# Patient Record
Sex: FEMALE | Race: BLACK OR AFRICAN AMERICAN
[De-identification: names, ages, dates, MRNs, and addresses within clinical notes are randomized per-mention and may not be internally consistent; named-entity substitution may affect disease eponyms.]

---

## 2017-04-24 ENCOUNTER — HOSPITAL ENCOUNTER (EMERGENCY)
Dept: HOSPITAL 74 - JERFT | Age: 57
Discharge: HOME | End: 2017-04-24
Payer: COMMERCIAL

## 2017-04-24 VITALS — TEMPERATURE: 100 F | DIASTOLIC BLOOD PRESSURE: 95 MMHG | SYSTOLIC BLOOD PRESSURE: 148 MMHG | HEART RATE: 102 BPM

## 2017-04-24 VITALS — BODY MASS INDEX: 35.3 KG/M2

## 2017-04-24 DIAGNOSIS — B95.0: ICD-10-CM

## 2017-04-24 DIAGNOSIS — N30.00: ICD-10-CM

## 2017-04-24 DIAGNOSIS — N20.0: ICD-10-CM

## 2017-04-24 DIAGNOSIS — J03.90: Primary | ICD-10-CM

## 2017-04-24 LAB
APPEARANCE UR: CLEAR
BILIRUB UR STRIP.AUTO-MCNC: NEGATIVE MG/DL
COLOR UR: YELLOW
KETONES UR QL STRIP: NEGATIVE
LEUKOCYTE ESTERASE UR QL STRIP.AUTO: (no result)
MUCOUS THREADS URNS QL MICRO: (no result)
NITRITE UR QL STRIP: NEGATIVE
PH UR: 5 [PH] (ref 5–8)
PROT UR QL STRIP: NEGATIVE
PROT UR QL STRIP: NEGATIVE
RBC # BLD AUTO: 1 /HPF (ref 0–3)
RBC # UR STRIP: (no result) /UL
SP GR UR: 1.02 (ref 1–1.03)
UROBILINOGEN UR STRIP-MCNC: NEGATIVE E.U./DL (ref 0.2–1)
WBC # UR AUTO: 2 /HPF (ref 3–5)

## 2017-04-24 NOTE — PDOC
History of Present Illness





- General


Chief Complaint: Sore Throat


Stated Complaint: SORE THROAT, FEVER


Time Seen by Provider: 04/24/17 09:15


History Source: Patient


Exam Limitations: No Limitations





- History of Present Illness


Initial Comments: 


04/24/17 09:16











Chief complaint: Body aches, sore throat, fever











History of present illness: Patient is a 57-year-old female history of 

hypertension, hyperlipidemia, COPD and diabetes controlled by diet here today 

complaining of generalized body aches, sore throat, fever, nausea, dry cough , 

rt. sided back pain since yesterday. She had one episode of vomiting this 

morning. Pt. also reports urinary frequency, urgency and dysuria since 04/21/ 2017. Since 04/21/2017 relieved by using her resuce pump.Patient reports that 

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 Patient denies any recent 

travel. Pt. reports feeling slightly short of breath presently. 





04/24/17 09:16








04/24/17 09:33








04/24/17 09:40








04/24/17 12:00








04/25/17 08:46





Timing/Duration: getting worse, changing over time


Severity: moderate


Associated Symptoms: reports: cough, fever/chills (since yesterday ), loss of 

appetite, nausea/vomiting (nausea X last 2 days, vomited once today ), 

shortness of breath (intermittently since 4/21/17 with exertion relieved by pump

), other (urgency, frequency, rt. sided lower back pain )





Past History





- Past Medical History


Allergies/Adverse Reactions: 


 Allergies











Allergy/AdvReac Type Severity Reaction Status Date / Time


 


No Known Allergies Allergy   Verified 04/24/17 08:53











Home Medications: 


Ambulatory Orders





Amlodipine Besylate [Norvasc -] 5 mg PO DAILY 03/19/15 


Colesevelam HCl [Welchol] 3.75 gm PO DAILY 03/19/15 


Nitrofurantoin Monohyd/M-Cryst [Macrobid -] 100 mg PO BID #14 capsule MDD 2 04/ 24/17 








COPD: Yes


Diabetes: Yes (DIET CONTROLLED)


HTN: Yes


Hypercholesterolemia: Yes (does not take medication)





- Surgical History


Cholecystectomy: Yes





- Immunization History


Immunization Up to Date: Yes





- Psycho/Social/Smoking Cessation Hx


Anxiety: No


Suicidal Ideation: No


Smoking Status: No


Smoking History: Never smoked


Have you smoked in the past 12 months: No


Number of Cigarettes Smoked Daily: 0


If you are a former smoker, when did you quit?: 5 years ago


Information on smoking cessation initiated: No


Hx Alcohol Use: No


Drug/Substance Use Hx: No


Substance Use Type: None


Hx Substance Use Treatment: No





**Review of Systems





- Review of Systems


Able to Perform ROS?: Yes


Constitutional: No: Symptoms Reported


HEENTM: Yes: Throat Pain


Respiratory: Yes: Cough, SOB with Exertion (since 4/21/17)


Cardiac (ROS): No: Symptoms Reported


ABD/GI: Yes: Nausea (since yesterday ), Vomiting


: Yes: Dysuria, Frequency, Urgency (since 4/21/17).  No: Hematuria


Musculoskeletal: Yes: Muscle Pain (rt. lower back ), Other (generalized body 

aches)


Integumentary: No: Symptoms Reported


Neurological: No: Symptoms reported





*Physical Exam





- Vital Signs


 Last Vital Signs











Temp Pulse Resp BP Pulse Ox


 


 100 F H  102 H  19   148/95   97 


 


 04/24/17 08:51  04/24/17 08:51  04/24/17 08:51  04/24/17 08:51  04/24/17 08:51














- Physical Exam


General Appearance: Yes: Appropriately Dressed


HEENT: positive: TMs Normal, Pharyngeal Erythema.  negative: Tonsillar Exudate, 

Tonsillar Erythema


Neck: negative: Lymphadenopathy (R), Lymphadenopathy (L)


Respiratory/Chest: positive: Lungs Clear, Normal Breath Sounds.  negative: 

Chest Tender, Respiratory Distress


Cardiovascular: positive: Regular Rhythm, Regular Rate, S1, S2


Gastrointestinal/Abdominal: positive: Normal Bowel Sounds, Soft.  negative: 

Tender, Organomegaly, Distended, Guarding, Rebound, Tenderness, Hepatomegaly, 

Spleenomegaly


Musculoskeletal: positive: Normal Inspection, CVA Tenderness (R), Other (rt. 

lower back tenderness).  negative: CVA Tenderness, CVA Tenderness (L)


Integumentary: positive: Normal Color


Neurologic: positive: Alert, Normal Response, Responsive





Medical Decision Making





- Medical Decision Making


04/24/17 09:41








Patient is a 57-year-old female history of hypertension, hyperlipidemia, COPD 

and diabetes controlled by diet here today complaining of generalized body aches

, sore throat, fever, nausea, dry cough , rt. sided back pain since yesterday. 

Her twin episode of vomiting this morning. Patient also reports urinary 

frequency urgency and dysuria since 04/21/2017. Since 04/21/2017 relieved by 

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 Patient denies any recent travel. {Pt. reports feeling 

slightly short of breath presently. 











right lower back pain 


Strep tonsillitis 


generalized bodyaches


b/l renal calculi  non obstructing 





UTI 

















PLAN: 





influenza A or B rapid negative


throat C & S  + for Beta Hemolytic strep Group A 


U/A 


urine C & S 


duoneb


zofran 4 mg sl 


Bicillin 1.2 million IM once 


ibuprofen 400 mg now


macrobid 100 mg bid for 7 days 


follow up with urology











 Laboratory Tests











  04/24/17





  09:30


 


Urine Color  Yellow


 


Urine Appearance  Clear


 


Urine pH  5.0


 


Ur Specific Gravity  1.023


 


Urine Protein  Negative


 


Urine Glucose (UA)  Negative


 


Urine Ketones  Negative


 


Urine Blood  1+ H


 


Urine Nitrite  Negative


 


Urine Bilirubin  Negative


 


Urine Urobilinogen  Negative


 


Ur Leukocyte Esterase  Trace H D


 


Urine RBC  1


 


Urine WBC  2


 


Ur Epithelial Cells  Rare


 


Urine Mucus  Rare


























04/24/17 10:56








04/24/17 12:02








04/24/17 12:06








04/24/17 12:13








04/25/17 08:48








04/25/17 08:48








*DC/Admit/Observation/Transfer


Diagnosis at time of Disposition: 


 Streptococcal tonsillitis, Renal calculus, bilateral





Urinary tract infection


Qualifiers:


 Urinary tract infection type: acute cystitis Hematuria presence: with 

hematuria Qualified Code(s): N30.01 - Acute cystitis with hematuria





- Discharge Dispostion


Disposition: HOME


Condition at time of disposition: Stable





- Prescriptions


Prescriptions: 


Nitrofurantoin Monohyd/M-Cryst [Macrobid -] 100 mg PO BID #14 capsule MDD 2





- Referrals


Referrals: 


Dani Nguyen MD [Primary Care Provider] - 


Terrence Ruiz MD [Staff Physician] - 





- Patient Instructions


Additional Instructions: 











Drink A lot a fluids especially cranberry











REturn To emergency room if symptoms worsen or new symptoms develop

















Follow Up with urologist as soon as possible











Throw out her toothbrush and get new toothbrush














Patient voiced understanding of discharge instructions and all questions were 

answered





























- Post Discharge Activity


Work/School Note:  Back to Work

## 2017-10-15 ENCOUNTER — HOSPITAL ENCOUNTER (EMERGENCY)
Dept: HOSPITAL 74 - JER | Age: 57
Discharge: HOME | End: 2017-10-15
Payer: COMMERCIAL

## 2017-10-15 VITALS — DIASTOLIC BLOOD PRESSURE: 78 MMHG | HEART RATE: 86 BPM | SYSTOLIC BLOOD PRESSURE: 145 MMHG | TEMPERATURE: 98.2 F

## 2017-10-15 VITALS — BODY MASS INDEX: 34.8 KG/M2

## 2017-10-15 DIAGNOSIS — N31.9: ICD-10-CM

## 2017-10-15 DIAGNOSIS — N39.0: Primary | ICD-10-CM

## 2017-10-15 DIAGNOSIS — N28.1: ICD-10-CM

## 2017-10-15 LAB
ALBUMIN SERPL-MCNC: 3.5 G/DL (ref 3.4–5)
ALP SERPL-CCNC: 87 U/L (ref 45–117)
ALT SERPL-CCNC: 32 U/L (ref 12–78)
ANION GAP SERPL CALC-SCNC: 6 MMOL/L (ref 8–16)
APPEARANCE UR: (no result)
AST SERPL-CCNC: 29 U/L (ref 15–37)
BACTERIA #/AREA URNS HPF: (no result) /HPF
BASOPHILS # BLD: 0.8 % (ref 0–2)
BILIRUB SERPL-MCNC: 1.3 MG/DL (ref 0.2–1)
BILIRUB UR STRIP.AUTO-MCNC: NEGATIVE MG/DL
CALCIUM SERPL-MCNC: 8.8 MG/DL (ref 8.5–10.1)
CO2 SERPL-SCNC: 30 MMOL/L (ref 21–32)
COLOR UR: YELLOW
CREAT SERPL-MCNC: 0.9 MG/DL (ref 0.55–1.02)
DEPRECATED RDW RBC AUTO: 14.4 % (ref 11.6–15.6)
EOSINOPHIL # BLD: 2.1 % (ref 0–4.5)
GLUCOSE SERPL-MCNC: 150 MG/DL (ref 74–106)
HYALINE CASTS URNS QL MICRO: 3 /LPF
KETONES UR QL STRIP: NEGATIVE
LEUKOCYTE ESTERASE UR QL STRIP.AUTO: (no result)
MAGNESIUM SERPL-MCNC: 1.9 MG/DL (ref 1.8–2.4)
MCH RBC QN AUTO: 26.9 PG (ref 25.7–33.7)
MCHC RBC AUTO-ENTMCNC: 32.6 G/DL (ref 32–36)
MCV RBC: 82.4 FL (ref 80–96)
MUCOUS THREADS URNS QL MICRO: (no result)
NEUTROPHILS # BLD: 49.2 % (ref 42.8–82.8)
NITRITE UR QL STRIP: NEGATIVE
PH UR: 5 [PH] (ref 5–8)
PLATELET # BLD AUTO: 221 K/MM3 (ref 134–434)
PMV BLD: 9 FL (ref 7.5–11.1)
PROT SERPL-MCNC: 7.3 G/DL (ref 6.4–8.2)
PROT UR QL STRIP: (no result)
PROT UR QL STRIP: NEGATIVE
RBC # BLD AUTO: 20 /HPF (ref 0–3)
RBC # UR STRIP: (no result) /UL
SP GR UR: 1.02 (ref 1–1.02)
UROBILINOGEN UR STRIP-MCNC: NEGATIVE MG/DL (ref 0.2–1)
WBC # BLD AUTO: 5.6 K/MM3 (ref 4–10)
WBC # UR AUTO: 9 /HPF (ref 3–5)

## 2017-10-15 PROCEDURE — 3E0333Z INTRODUCTION OF ANTI-INFLAMMATORY INTO PERIPHERAL VEIN, PERCUTANEOUS APPROACH: ICD-10-PCS

## 2017-10-15 PROCEDURE — 3E033GC INTRODUCTION OF OTHER THERAPEUTIC SUBSTANCE INTO PERIPHERAL VEIN, PERCUTANEOUS APPROACH: ICD-10-PCS

## 2017-10-15 NOTE — PDOC
History of Present Illness





- General


Chief Complaint: Pain


Stated Complaint: PAIN


Time Seen by Provider: 10/15/17 14:54


History Source: Patient


Exam Limitations: No Limitations





- History of Present Illness


Travel History: No


Initial Comments: 





10/15/17 16:31


58 y/o female presents the ED with complaints of right flank pain associated 

with nausea and mild urinary frequency. Patient states has history of renal 

colic and recently was diagnosed with the UTI but was unable to  the 

antibiotics due to her pain today. Patient states is followed by Dr. Nguyen who 

was a prescribing physician for the urinary tract infection. Patient denies 

fever, chills, vomiting, hematuria, abdominal distention, or diarrhea.


Timing/Duration: reports: constant


Quality: reports: moderate


Abdominal Pain Onset Location: reports: flank (rt)


Pain Radiation: reports: back


Activities at Onset: reports: none


Aggravating Factors: improves with: None


Alleviating Factors: improves with: None





Past History





- Travel


Traveled outside of the country in the last 30 days: No


Close contact w/someone who was outside of country & ill: No





- Past Medical History


Allergies/Adverse Reactions: 


 Allergies











Allergy/AdvReac Type Severity Reaction Status Date / Time


 


No Known Allergies Allergy   Verified 10/15/17 14:46











Home Medications: 


Ambulatory Orders





Amlodipine Besylate [Norvasc -] 5 mg PO DAILY 03/19/15 


Acetaminophen [Tylenol] 325 mg PO Q6H PRN #16 tablet 10/15/17 


Nitrofurantoin Monohyd/M-Cryst [Macrobid -] 100 mg PO BID #14 capsule 10/15/17 








COPD: Yes


Diabetes: Yes (DIET CONTROLLED)


HTN: Yes


Hypercholesterolemia: Yes (does not take medication)





- Surgical History


Cholecystectomy: Yes





- Immunization History


Immunization Up to Date: Yes





- Suicide/Smoking/Psychosocial Hx


Smoking Status: No


Smoking History: Never smoked


Have you smoked in the past 12 months: No


Number of Cigarettes Smoked Daily: 0


If you are a former smoker, when did you quit?: 5 years ago


Information on smoking cessation initiated: No


Hx Alcohol Use: No


Drug/Substance Use Hx: No


Substance Use Type: None


Hx Substance Use Treatment: No


Patient Lives Alone: No


Lives with/in: spouse/SO





**Review of Systems





- Review of Systems


Able to Perform ROS?: Yes


Constitutional: No: Symptoms Reported


HEENTM: No: Symptoms Reported


Respiratory: No: Symptoms reported


Cardiac (ROS): No: Symptoms Reported


ABD/GI: Yes: Nausea, Abdominal cramping


: Yes: Frequency, Flank Pain


Musculoskeletal: Yes: Back Pain


Integumentary: No: Symptoms Reported


Neurological: No: Symptoms reported


Endocrine: No: Symptoms Reported





*Physical Exam





- Vital Signs


 Last Vital Signs











Temp Pulse Resp BP Pulse Ox


 


 98.2 F   86   18   145/78   100 


 


 10/15/17 14:40  10/15/17 14:40  10/15/17 14:40  10/15/17 14:40  10/15/17 14:40














- Physical Exam


General Appearance: Yes: Nourished, Appropriately Dressed.  No: Apparent 

Distress


HEENT: negative: Pale Conjunctivae


Neck: positive: Supple


Respiratory/Chest: positive: Lungs Clear, Normal Breath Sounds.  negative: 

Respiratory Distress, Accessory Muscle Use


Cardiovascular: positive: Regular Rhythm, Regular Rate.  negative: Murmur


Gastrointestinal/Abdominal: positive: Normal Bowel Sounds, Soft, Tenderness (rt 

flank).  negative: Distended, Guarding, Rebound


Musculoskeletal: positive: CVA Tenderness (R)


Extremity: positive: Normal Capillary Refill.  negative: Pedal Edema


Integumentary: positive: Normal Color, Warm, Moist


Neurologic: positive: Motor Strength 5/5 (ambulatory)





ED Treatment Course





- LABORATORY


CBC & Chemistry Diagram: 


 10/15/17 15:45





 10/15/17 15:45





Medical Decision Making





- Medical Decision Making


10/15/17 17:00


Patient here with complaints of right flank pain and urinary frequency and 

nausea. Patient states history of renal colic and recently was diagnosed with 

UTI by Dr. knox but was unable to start antibiotics today secondary to above 

symptoms. Patient on exam had right CVA tenderness along the right flank pain. 

Patient with a CBC, comp, urinalysis, urine culture Zofran, Toradol IV fluids 

and kidney ultrasound.





10/15/17 17:22


 Laboratory Tests











  10/15/17 10/15/17 10/15/17





  15:45 15:45 15:45


 


WBC  5.6  


 


Hgb  12.9  


 


Hct  39.7  


 


Plt Count  221  


 


Neutrophils %  49.2  D  


 


Lymphocytes %  41.4 H D  


 


Sodium    143


 


Potassium    3.2 L


 


Chloride    107


 


Carbon Dioxide    30


 


Anion Gap    6 L


 


BUN    12


 


Creatinine    0.9


 


Creat Clearance w eGFR    > 60


 


Random Glucose    150 H D


 


Calcium    8.8


 


Magnesium    1.9


 


Total Bilirubin    1.3 H D


 


AST    29  D


 


ALT    32  D


 


Lipase    133


 


Urine Ketones   Negative 


 


Urine Blood   1+ H 


 


Ur Leukocyte Esterase   Pending 


 


Urine RBC   20 


 


Urine WBC   9 








k-Dur 40 meq po x 1 ordered. 





*DC/Admit/Observation/Transfer


Diagnosis at time of Disposition: 


 Urinary tract infection, Renal cyst





- Discharge Dispostion


Disposition: HOME





- Prescriptions


Prescriptions: 


Nitrofurantoin Monohyd/M-Cryst [Macrobid -] 100 mg PO BID #14 capsule


Acetaminophen [Tylenol] 325 mg PO Q6H PRN #16 tablet


 PRN Reason: Pain





- Referrals


Referrals: 


Dani Nguyen MD [Primary Care Provider] - 


Saroj Bhandari MD [Staff Physician] - 





- Patient Instructions


Printed Discharge Instructions:  DI for Urinary Tract Infection (UTI)


Additional Instructions: 


Please take medications as prescribed.  Follow up with Dr. Nguyen by the end of 

this week. You may manage the discomfort from the cyst from your kidney using 

Tylenol; however you should follow up with a nephrologist for continued 

monitoring of your kidney function.  If you develop any fever, chills, vomiting

, diarrhea, unmanageable pain, or any new or worsening symptoms, please return 

to the ER.

## 2017-10-15 NOTE — PDOC
*Physical Exam





- Vital Signs


 Last Vital Signs











Temp Pulse Resp BP Pulse Ox


 


 98.2 F   86   18   145/78   100 


 


 10/15/17 14:40  10/15/17 14:40  10/15/17 14:40  10/15/17 14:40  10/15/17 14:40














- Physical Exam


Comments: 


10/15/17 19:35


Sign-out received from outgoing ER provider Sonya.


Pt interviewed and examined.


Ancillary studies reviewed. 





Awaiting ultrasound results.











10/15/17 20:13


Ultrasound positive for 2.4cm complex cyst in upper pole of left kidney.  No 

hydronephrosis b/l.  No obvious renal calculi.  Some doppler vascular flow 

noted in both kidneys.  Incidental hepatic steatosis.  





Advised patient to f/u with Dr. Nguyen and to  antibiotics for UTI.  

Patient states she wants to  medication today from [facility] because Dr. Nguyen sent medication to [facility]; patient is unsure what 

antibiotics he prescribed. 





Will rx Macrobid for UTI and Tylenol for discomfort secondary to cyst.  





Advised patient to take antibiotics as prescribed and of signs and symptoms for 

return to ER; patietn verbalized understanding and agrees to plan. 





ED Treatment Course





- LABORATORY


CBC & Chemistry Diagram: 


 10/15/17 15:45





 10/15/17 15:45





- ADDITIONAL ORDERS


Additional order review: 


 Laboratory  Results











  10/15/17 10/15/17





  15:45 15:45


 


Sodium  143 


 


Potassium  3.2 L 


 


Chloride  107 


 


Carbon Dioxide  30 


 


Anion Gap  6 L 


 


BUN  12 


 


Creatinine  0.9 


 


Creat Clearance w eGFR  > 60 


 


Random Glucose  150 H D 


 


Calcium  8.8 


 


Magnesium  1.9 


 


Total Bilirubin  1.3 H D 


 


AST  29  D 


 


ALT  32  D 


 


Alkaline Phosphatase  87 


 


Total Protein  7.3 


 


Albumin  3.5 


 


Lipase  133 


 


Urine Color   Yellow


 


Urine Appearance   Cloudy


 


Urine pH   5.0


 


Ur Specific Gravity   1.025


 


Urine Protein   Negative


 


Urine Glucose (UA)   1+ H


 


Urine Ketones   Negative


 


Urine Blood   1+ H


 


Urine Nitrite   Negative


 


Urine Bilirubin   Negative


 


Urine Urobilinogen   Negative


 


Ur Leukocyte Esterase   1+ H


 


Urine RBC   20


 


Urine WBC   9


 


Ur Epithelial Cells   Few


 


Urine Bacteria   Rare


 


Hyaline Casts   3


 


Urine Mucus   Rare








 











  10/15/17





  15:45


 


RBC  4.82


 


MCV  82.4


 


MCHC  32.6


 


RDW  14.4


 


MPV  9.0


 


Neutrophils %  49.2  D


 


Lymphocytes %  41.4 H D


 


Monocytes %  6.5


 


Eosinophils %  2.1


 


Basophils %  0.8














- Medications


Given in the ED: 


ED Medications














Discontinued Medications














Generic Name Dose Route Start Last Admin





  Trade Name Freq  PRN Reason Stop Dose Admin


 


Sodium Chloride  1,000 mls @ 1,000 mls/hr 10/15/17 15:27 10/15/17 15:30





  Normal Saline -  IV 10/15/17 16:26  1,000 mls/hr





  ASDIR STA   Administration


 


Ketorolac Tromethamine  30 mg 10/15/17 15:27 10/15/17 15:30





  Toradol Injection -  IVPUSH 10/15/17 15:28  30 mg





  ONCE ONE   Administration


 


Ondansetron HCl  4 mg 10/15/17 15:27 10/15/17 15:30





  Zofran Injection  IVPUSH 10/15/17 15:28  4 mg





  ONCE ONE   Administration


 


Potassium Chloride  40 meq 10/15/17 17:21 10/15/17 17:25





  K-Dur -  PO 10/15/17 17:22  40 meq





  ONCE ONE   Administration














*DC/Admit/Observation/Transfer


Diagnosis at time of Disposition: 


 Renal cyst





Urinary tract infection


Qualifiers:


 Urinary tract infection type: site unspecified Hematuria presence: with 

hematuria Qualified Code(s): N39.0 - Urinary tract infection, site not specified

; N39.0 - Urinary tract infection, site not specified; R31.9 - Hematuria, 

unspecified; R31.9 - Hematuria, unspecified





- Discharge Dispostion


Disposition: HOME


Condition at time of disposition: Improved


Admit: No





- Prescriptions


Prescriptions: 


Nitrofurantoin Monohyd/M-Cryst [Macrobid -] 100 mg PO BID #14 capsule


Acetaminophen [Tylenol] 325 mg PO Q6H PRN #16 tablet


 PRN Reason: Pain





- Referrals


Referrals: 


Dani Nguyen MD [Primary Care Provider] - 


Saroj Bhandari MD [Staff Physician] - 





- Patient Instructions


Printed Discharge Instructions:  DI for Urinary Tract Infection (UTI)


Additional Instructions: 


Please take medications as prescribed.  Follow up with Dr. Nguyen by the end of 

this week. You may manage the discomfort from the cyst from your kidney using 

Tylenol; however you should follow up with a nephrologist for continued 

monitoring of your kidney function.  If you develop any fever, chills, vomiting

, diarrhea, unmanageable pain, or any new or worsening symptoms, please return 

to the ER.

## 2018-04-25 ENCOUNTER — HOSPITAL ENCOUNTER (EMERGENCY)
Dept: HOSPITAL 74 - JER | Age: 58
Discharge: HOME | End: 2018-04-25
Payer: COMMERCIAL

## 2018-04-25 VITALS — TEMPERATURE: 98.5 F

## 2018-04-25 VITALS — BODY MASS INDEX: 34.3 KG/M2

## 2018-04-25 VITALS — HEART RATE: 76 BPM | SYSTOLIC BLOOD PRESSURE: 130 MMHG | DIASTOLIC BLOOD PRESSURE: 69 MMHG

## 2018-04-25 DIAGNOSIS — I10: ICD-10-CM

## 2018-04-25 DIAGNOSIS — Z86.73: ICD-10-CM

## 2018-04-25 DIAGNOSIS — N20.0: ICD-10-CM

## 2018-04-25 DIAGNOSIS — K57.31: Primary | ICD-10-CM

## 2018-04-25 DIAGNOSIS — N39.0: ICD-10-CM

## 2018-04-25 LAB
ALBUMIN SERPL-MCNC: 3.7 G/DL (ref 3.4–5)
ALP SERPL-CCNC: 81 U/L (ref 45–117)
ALT SERPL-CCNC: 30 U/L (ref 12–78)
ANION GAP SERPL CALC-SCNC: 9 MMOL/L (ref 8–16)
APPEARANCE UR: (no result)
AST SERPL-CCNC: 29 U/L (ref 15–37)
BACTERIA #/AREA URNS HPF: (no result) /HPF
BASOPHILS # BLD: 0.8 % (ref 0–2)
BILIRUB SERPL-MCNC: 1.4 MG/DL (ref 0.2–1)
BILIRUB UR STRIP.AUTO-MCNC: NEGATIVE MG/DL
BUN SERPL-MCNC: 12 MG/DL (ref 7–18)
CALCIUM SERPL-MCNC: 9.4 MG/DL (ref 8.5–10.1)
CHLORIDE SERPL-SCNC: 105 MMOL/L (ref 98–107)
CO2 SERPL-SCNC: 28 MMOL/L (ref 21–32)
COLOR UR: YELLOW
CREAT SERPL-MCNC: 0.7 MG/DL (ref 0.55–1.02)
DEPRECATED RDW RBC AUTO: 14 % (ref 11.6–15.6)
EOSINOPHIL # BLD: 2.8 % (ref 0–4.5)
EPITH CASTS URNS QL MICRO: (no result) /HPF
GLUCOSE SERPL-MCNC: 121 MG/DL (ref 74–106)
HCT VFR BLD CALC: 39.4 % (ref 32.4–45.2)
HGB BLD-MCNC: 13.1 GM/DL (ref 10.7–15.3)
HYALINE CASTS URNS QL MICRO: 2 /LPF
KETONES UR QL STRIP: NEGATIVE
LEUKOCYTE ESTERASE UR QL STRIP.AUTO: (no result)
LIPASE SERPL-CCNC: 88 U/L (ref 73–393)
LYMPHOCYTES # BLD: 47.2 % (ref 8–40)
MCH RBC QN AUTO: 27.6 PG (ref 25.7–33.7)
MCHC RBC AUTO-ENTMCNC: 33.2 G/DL (ref 32–36)
MCV RBC: 83.3 FL (ref 80–96)
MONOCYTES # BLD AUTO: 8.5 % (ref 3.8–10.2)
MUCOUS THREADS URNS QL MICRO: (no result)
NEUTROPHILS # BLD: 40.7 % (ref 42.8–82.8)
NITRITE UR QL STRIP: NEGATIVE
PH UR: 5 [PH] (ref 5–8)
PLATELET # BLD AUTO: 227 K/MM3 (ref 134–434)
PMV BLD: 8.6 FL (ref 7.5–11.1)
POTASSIUM SERPLBLD-SCNC: 3.6 MMOL/L (ref 3.5–5.1)
PROT SERPL-MCNC: 7.5 G/DL (ref 6.4–8.2)
PROT UR QL STRIP: NEGATIVE
PROT UR QL STRIP: NEGATIVE
RBC # BLD AUTO: 4.73 M/MM3 (ref 3.6–5.2)
RBC # UR STRIP: NEGATIVE /UL
SODIUM SERPL-SCNC: 142 MMOL/L (ref 136–145)
SP GR UR: 1.02 (ref 1–1.03)
UROBILINOGEN UR STRIP-MCNC: NEGATIVE MG/DL (ref 0.2–1)
WBC # BLD AUTO: 5.4 K/MM3 (ref 4–10)

## 2018-04-25 PROCEDURE — 3E033NZ INTRODUCTION OF ANALGESICS, HYPNOTICS, SEDATIVES INTO PERIPHERAL VEIN, PERCUTANEOUS APPROACH: ICD-10-PCS

## 2018-04-25 NOTE — PDOC
Attending Attestation





- HPI


HPI: 





04/25/18 14:40


The patient is a 58 year old female, with a significant past medical history of 

diabetes, hypertension, hyperlipidemia, TIAX2, 


multiple ovarian cysts, TIAx2, and multiple abdominal surgeries (CSx2, 

cholecystectomy, partial hysterectomy, ovarian cyst removal, and partial 

colectomy to remove a portion of necrotic bowel), who presents to the emergency 

department with episode of blood in stool 5 days ago and right lower quadrant 

pain since today. Patient reports she first noted bright red blood in the 

toilet bowl about 5 days ago. Today, she reports new onset of right lower 

quadrant pain, sharp in nature, and nonradiating. Patient currently rates the 

pain a 7/10. She reports associated nausea, but denies any diarrhea or 

constipation. Patient reports she has been able to pass gas normally. She 

denies any recent fever, chills, chest pain, shortness of breath, diaphoresis, 

or palpitations. She denies any recent travel, sick contacts, or heavy lifting. 

The patient is on Aspirin








- Medical Decision Making





04/25/18 14:40





Documentation prepared by Adri Gilmore, acting as medical scribe for Stephan Christina MD.





<Adri Gilmore - Last Filed: 04/25/18 14:40>





- Resident


Resident Name: Melina Diop





- ED Attending Attestation


I have performed the following: I have examined & evaluated the patient, The 

case was reviewed & discussed with the resident, I agree w/resident's findings 

& plan, Exceptions are as noted





- Physicial Exam


PE: 





04/25/18 16:15


Patient is awake and alert, obese, in mild distress;





Normocephalic, atraumatic


PERRLA, EOMI, no scleral icterus


CTA


RRR


Abdomen: Soft, distended, nontympanitic, with mild to moderate right lower 

quadrant and right-sided abdominal tenderness to palpation without guarding 

rebound, no CVA tenderness





- Medical Decision Making








04/25/18 16:15


Patient is a 58-year-old female with multiple abdominal surgeries who presents 

with right-sided abdominal pain, nausea and obstipation. Differential diagnoses 

includes SBO versus UTI versus acute appendicitis versus colitis versus 

diverticulitis. Will obtain CBC/CMP/UA. Will obtain CT that and pelvis with by 

mouth contrast. Will hydrate. Will reassess.





<Stephan Christina - Last Filed: 04/25/18 16:16>

## 2018-04-25 NOTE — PDOC
History of Present Illness





- General


Chief Complaint: Pain, Acute


Stated Complaint: RT SIDE PAIN


Time Seen by Provider: 04/25/18 13:33


History Source: Patient


Exam Limitations: No Limitations





- History of Present Illness


Initial Comments: 


This is a 58 YOF with h/o NIDDM, HTN, HLD, multiple ovarian cysts, TIAx2, and 

multiple abdominal surgeries (CSx2, cholecystectomy, partial HYST, ovarian cyst 

removal, and partial colectomy to remove a portion of necrotic bowel which she 

says was d/t adhesions) who p/w BRBPR on 5 days ago (states about one cup), and 

RLQ pain since this morning. She notes 7/10 sharp nagging non-radiating RLQ 

pain currently which was actually 10/10 at the first onset this morning. She 

additionally notes nausea, recent dark/black stool for a few episodes since 

Friday, and concentrated urine but she denies any actual vomiting, diarrhea, 

constipation, fever, chills, chest pain, SOB, painful urination, headache, or 

lightheadedness. She has been passing gas normally today. She denies any sick 

contacts or heavy lifting.





Past History





- Past Medical History


Allergies/Adverse Reactions: 


 Allergies











Allergy/AdvReac Type Severity Reaction Status Date / Time


 


No Known Allergies Allergy   Verified 04/25/18 13:11











Home Medications: 


Ambulatory Orders





Amlodipine Besylate [Norvasc -] 5 mg PO DAILY 03/19/15 


Aspirin [ASA -] 81 mg PO DAILY 04/25/18 


Ciprofloxacin HCl [Cipro] 500 mg PO BID #14 tablet 04/25/18 


Lisinopril 5 mg PO DAILY 04/25/18 


Metformin HCl 500 mg PO BID 04/25/18 








COPD: Yes


Diabetes: Yes (DIET CONTROLLED)


HTN: Yes


Hypercholesterolemia: Yes (does not take medication)





- Surgical History


Cholecystectomy: Yes





- Immunization History


Immunization Up to Date: Yes





- Suicide/Smoking/Psychosocial Hx


Smoking Status: No


Smoking History: Never smoked


Have you smoked in the past 12 months: No


Number of Cigarettes Smoked Daily: 0


If you are a former smoker, when did you quit?: 12YRS


Information on smoking cessation initiated: No


Hx Alcohol Use: No


Drug/Substance Use Hx: No


Substance Use Type: None


Hx Substance Use Treatment: No





**Review of Systems





- Review of Systems


Able to Perform ROS?: Yes


Constitutional: No: Chills, Fever, Unexplained wgt Loss


HEENTM: No: Nose Congestion, Throat Pain


Respiratory: No: Cough, Shortness of Breath


Cardiac (ROS): No: Chest Pain, Palpitations


ABD/GI: Yes: Nausea, Rectal Bleeding, Other (abdominal pain).  No: Constipated, 

Diarrhea, Vomiting


: Yes: Other (concentrated urine).  No: Burning, Dysuria


Musculoskeletal: No: Back Pain, Neck Pain


Integumentary: No: Bruising, Rash


Neurological: No: Headache, Numbness, Tingling, Weakness, Dizziness


Endocrine: No: Unexplained Weight Gain, Unexplained Weight Loss





*Physical Exam





- Vital Signs


 Last Vital Signs











Temp Pulse Resp BP Pulse Ox


 


 98.5 F   72   17   134/83   97 


 


 04/25/18 13:12  04/25/18 13:12  04/25/18 13:12  04/25/18 13:12  04/25/18 13:12














- Physical Exam


General Appearance: Yes: Nourished, Appropriately Dressed, Obese, Other (alert, 

oriented, pleasant, appropriately answering adult female accompanied by her 

daughter).  No: Apparent Distress


HEENT: positive: EOMI, Normal Voice, Hearing Grossly Normal.  negative: Scleral 

Icterus (R), Scleral Icterus (L), Nasal Congestion


Neck: positive: Trachea midline, Supple.  negative: Tender, Rigid


Respiratory/Chest: positive: Lungs Clear, Normal Breath Sounds.  negative: 

Respiratory Distress, Crackles, Rhonchi, Stridor, Wheezing


Cardiovascular: positive: Regular Rhythm, Regular Rate, S1, S2.  negative: Edema

, JVD, Murmur


Gastrointestinal/Abdominal: positive: Normal Bowel Sounds, Tender (mild ttp RLQ 

with minimal RUQ and epigastric ttp, no peritoneal signs), Soft.  negative: 

Organomegaly, Pulsatile Mass, Guarding


Rectal Exam: positive: normal exam, normal rectal tone, other (no stool in the 

rectal vault, no blood and minimal sample can be obtained for FOBT).  negative: 

hemorrhoids


Musculoskeletal: positive: Normal Inspection.  negative: Decreased Range of 

Motion, Vertebral Tenderness


Extremity: positive: Normal Capillary Refill, Normal Inspection, Normal Range 

of Motion.  negative: Tender, Cyanosis


Integumentary: positive: Normal Color, Dry, Warm.  negative: Erythema, Rash, 

Bruising


Neurologic: positive: CNs II-XII NML intact (grossly), Fully Oriented, Alert, 

Normal Mood/Affect, Normal Response, Motor Strength 5/5.  negative: Facial Droop

, Numbness, Sensory Deficit, Confused, Disoriented





ED Treatment Course





- LABORATORY


CBC & Chemistry Diagram: 


 04/25/18 13:54





 04/25/18 13:54





Medical Decision Making





- Medical Decision Making


Adult female patient with many prior abdominal surgeries p/w RLQ pain and BRBPR.





 Initial Vital Signs











Temp Pulse Resp BP Pulse Ox


 


 98.5 F   72   17   134/83   97 


 


 04/25/18 13:12  04/25/18 13:12  04/25/18 13:12  04/25/18 13:12  04/25/18 13:12











Exam: mild ttp RLQ, minimal RUQ and epigastrium ttp without peritoneal signs or 

CVA ttp, no midline pulsatile masses.


DDX IBNLT: UTI/pyelonephritis, renal colic, SBO, bowel ischemia, bowel 

perforation, malignancy, ovarian torsion, ovarian cyst, AAA/AD, hernia, 

pancreatitis, gastritis, PUD, appendicitis, diverticulitis wwo abscess or 

perforation, colitis, regional ileitis (Crohns disease), musculoskeletal, etc


W/U ordered: CBCD CMP Lactate Lipase UA UCx FOBT


TX ordered: IVF, Ofirmev





Unlikely UTI/pyelonephritis as patient has no dysuria, strange colors/smells, 

no h/o recurrent UTI.


Unlikely renal stone as patient notes no clinical hematuria, has no CVA ttp.


Unlikely SBO as patient has been passing stool and gas normally per their 

baseline.


Unlikely mesenteric ischemia as patient has no known A-fib or coagulopathy, no 

pain out of proportion.


Unlikely bowel perforation as patient does not appear to have acute abdomen, no 

peritoneal signs.


Unlikely malignancy as patient has no palpable mass, no reported recent B 

symptoms.


Unlikely diverticulitis as patient has no known h/o diverticulosis/

diverticulitis.


Unlikely colitis as clinically pts abdomen is not distended/no ascites, no 

fever, other VS wnl.


Unlikely ovarian torsion as patient has no adnexal ttp on bimanual exam.


Unlikely ovarian cyst as patient denies h/o ovarian cysts, unlikely hemorrhagic 

as pt denies sxs of anemia.


Unlikely AAA/AD as no midline pulsatile mass or h/o AAA/AD, denies h/o HTN or 

connective tissue d/o.


Unlikely hernia as there is no outwardly palpable lump.


Unlikely pancreatitis as pt denies EtOH use, h/o gallstones, no known 

hypercalcemia.


Unlikely gastritis as pt denies h/o significant GERD, no overuse of EtOH.


Unlikely PUD as pt does not report relief of pain ~2 hours postprandially or 

with antacids.


Unlikely appendicitis as pt has no fever, no anorexia, no marked RLQ or 

umbilical ttp.


Unlikely Crohns as pt denies recent wt loss, anorexia, diarrhea, or h/o Crohns 

(though onset MC in 20s & 50s).





 Laboratory Results - last 24 hr











  04/25/18 04/25/18 04/25/18





  13:54 13:54 13:54


 


WBC  5.4  


 


RBC  4.73  


 


Hgb  13.1  


 


Hct  39.4  


 


MCV  83.3  


 


MCH  27.6  


 


MCHC  33.2  


 


RDW  14.0  


 


Plt Count  227  


 


MPV  8.6  


 


Neutrophils %  40.7 L  


 


Lymphocytes %  47.2 H  


 


Monocytes %  8.5  


 


Eosinophils %  2.8  


 


Basophils %  0.8  


 


Sodium    142


 


Potassium    3.6


 


Chloride    105


 


Carbon Dioxide    28


 


Anion Gap    9


 


BUN    12


 


Creatinine    0.7


 


Creat Clearance w eGFR    > 60


 


Random Glucose    121 H


 


Lactic Acid   


 


Calcium    9.4


 


Total Bilirubin    1.4 H


 


AST    29


 


ALT    30


 


Alkaline Phosphatase    81


 


Total Protein    7.5


 


Albumin    3.7


 


Lipase    88


 


Urine Color   Yellow 


 


Urine Appearance   Slcloudy 


 


Urine pH   5.0 


 


Ur Specific Gravity   1.023 


 


Urine Protein   Negative 


 


Urine Glucose (UA)   Negative 


 


Urine Ketones   Negative 


 


Urine Blood   Negative 


 


Urine Nitrite   Negative 


 


Urine Bilirubin   Negative 


 


Urine Urobilinogen   Negative 


 


Ur Leukocyte Esterase   2+ H D 


 


Urine WBC (Auto)   6 


 


Urine RBC (Auto)   3 


 


Ur Epithelial Cells   Few 


 


Urine Bacteria   Rare 


 


Hyaline Casts   2 


 


Urine Mucus   Few 


 


Stool Occult Blood   














  04/25/18 04/25/18





  14:08 14:08


 


WBC  


 


RBC  


 


Hgb  


 


Hct  


 


MCV  


 


MCH  


 


MCHC  


 


RDW  


 


Plt Count  


 


MPV  


 


Neutrophils %  


 


Lymphocytes %  


 


Monocytes %  


 


Eosinophils %  


 


Basophils %  


 


Sodium  


 


Potassium  


 


Chloride  


 


Carbon Dioxide  


 


Anion Gap  


 


BUN  


 


Creatinine  


 


Creat Clearance w eGFR  


 


Random Glucose  


 


Lactic Acid   1.7


 


Calcium  


 


Total Bilirubin  


 


AST  


 


ALT  


 


Alkaline Phosphatase  


 


Total Protein  


 


Albumin  


 


Lipase  


 


Urine Color  


 


Urine Appearance  


 


Urine pH  


 


Ur Specific Gravity  


 


Urine Protein  


 


Urine Glucose (UA)  


 


Urine Ketones  


 


Urine Blood  


 


Urine Nitrite  


 


Urine Bilirubin  


 


Urine Urobilinogen  


 


Ur Leukocyte Esterase  


 


Urine WBC (Auto)  


 


Urine RBC (Auto)  


 


Ur Epithelial Cells  


 


Urine Bacteria  


 


Hyaline Casts  


 


Urine Mucus  


 


Stool Occult Blood  Negative 








CT: probable mild sigmoid diverticulosis without diverticulitis no other acute 

causative pathology.


Reassessment: Minimal RLQ ttp without peritoneal signs, improved from 

presentation


Repeat VS:





The patient has gotten significant relief of symptoms with ED medications.


Workup is not concerning for emergency-level pathology at this time.


The patient is appropriate for discharge with close outpatient follow up.


The patient is comfortable with this plan and will follow up with their PCP in 1

-3 days.


She will take Motrin and/or Tylenol for pain.


She will  her Rx for cipro for UTI and diverticulosis.


She will follow up with their regular doctor in the next 1-3 days.


Return precautions are discussed and they will come back to the ER if necessary.





*DC/Admit/Observation/Transfer


Diagnosis at time of Disposition: 


 Renal calculus, bilateral





Urinary tract infection


Qualifiers:


 Urinary tract infection type: site unspecified Hematuria presence: with 

hematuria Qualified Code(s): N39.0 - Urinary tract infection, site not specified





Diverticulosis


Qualifiers:


 Diverticulosis site: diverticulosis of large intestine Diverticulosis bleeding

: diverticulosis with bleeding Qualified Code(s): K57.31 - Diverticulosis of 

large intestine without perforation or abscess with bleeding








- Discharge Dispostion


Disposition: HOME


Condition at time of disposition: Stable


Admit: No





- Prescriptions


Prescriptions: 


Ciprofloxacin HCl [Cipro] 500 mg PO BID #14 tablet





- Referrals


Referrals: 


Maxim Grover MD [Staff Physician] - 


Dani Nguyen MD [Primary Care Provider] - 





- Patient Instructions


Additional Instructions: 


You were seen in the ER for lower abdominal pain and rectal bleeding. We did an 

exam, laboratory work on your blood and urine, and CT scan, which showed a mild 

colon diverticulosis and a bladder infection, as well as some kidney stones 

which are not currently trying to pass. We did not find any other signs of an 

emergency. Your pain improved with the medications we gave you here in the ER. 

After our assessment, we believe you are not having a medical emergency and you 

are safe to go home. Please take over-the-counter pain relievers like Tylenol. 

 your prescriptions for antibiotic which we are sending to your 

pharmacy. Follow up with your regular doctor(s) in the next 1-3 days. Also 

follow up with Dr. Grover. Call their clinic ASAP, tell them you were seen in 

the ER, and tell them you need an appointment. Please come back to the ER at 

any time, 24 hours a day, for any new or worsening symptoms, like worsening 

pelvic pain, discharge, high fever, or other symptoms. If you are having severe 

or life threatening symptoms, or symptoms that make it unsafe to drive or have 

someone drive you, please call 911.





- Post Discharge Activity


Forms/Work/School Notes:  Back to Work

## 2018-04-25 NOTE — PDOC
*Physical Exam





- Vital Signs


 Last Vital Signs











Temp Pulse Resp BP Pulse Ox


 


 98.5 F   76   16   130/69   99 


 


 04/25/18 13:12  04/25/18 18:29  04/25/18 18:29  04/25/18 18:29  04/25/18 18:29














- Physical Exam


Comments: 





04/25/18 19:52


Gen: aaox3, nad


heart: +1s2 reg


Lungs: cta b/l


Abd: soft, no cva ttp, mild RLQ ttp, no rebound or guarding, obese


ext: no edema





ED Treatment Course





- LABORATORY


CBC & Chemistry Diagram: 


 04/25/18 13:54





 04/25/18 13:54





- ADDITIONAL ORDERS


Additional order review: 


 Laboratory  Results











  04/25/18 04/25/18 04/25/18





  14:08 14:08 13:54


 


Sodium    142


 


Potassium    3.6


 


Chloride    105


 


Carbon Dioxide    28


 


Anion Gap    9


 


BUN    12


 


Creatinine    0.7


 


Creat Clearance w eGFR    > 60


 


Random Glucose    121 H


 


Lactic Acid  1.7  


 


Calcium    9.4


 


Total Bilirubin    1.4 H


 


AST    29


 


ALT    30


 


Alkaline Phosphatase    81


 


Total Protein    7.5


 


Albumin    3.7


 


Lipase    88


 


Urine Color   


 


Urine Appearance   


 


Urine pH   


 


Ur Specific Gravity   


 


Urine Protein   


 


Urine Glucose (UA)   


 


Urine Ketones   


 


Urine Blood   


 


Urine Nitrite   


 


Urine Bilirubin   


 


Urine Urobilinogen   


 


Ur Leukocyte Esterase   


 


Urine WBC (Auto)   


 


Urine RBC (Auto)   


 


Ur Epithelial Cells   


 


Urine Bacteria   


 


Hyaline Casts   


 


Urine Mucus   


 


Stool Occult Blood   Negative 














  04/25/18





  13:54


 


Sodium 


 


Potassium 


 


Chloride 


 


Carbon Dioxide 


 


Anion Gap 


 


BUN 


 


Creatinine 


 


Creat Clearance w eGFR 


 


Random Glucose 


 


Lactic Acid 


 


Calcium 


 


Total Bilirubin 


 


AST 


 


ALT 


 


Alkaline Phosphatase 


 


Total Protein 


 


Albumin 


 


Lipase 


 


Urine Color  Yellow


 


Urine Appearance  Slcloudy


 


Urine pH  5.0


 


Ur Specific Gravity  1.023


 


Urine Protein  Negative


 


Urine Glucose (UA)  Negative


 


Urine Ketones  Negative


 


Urine Blood  Negative


 


Urine Nitrite  Negative


 


Urine Bilirubin  Negative


 


Urine Urobilinogen  Negative


 


Ur Leukocyte Esterase  2+ H D


 


Urine WBC (Auto)  6


 


Urine RBC (Auto)  3


 


Ur Epithelial Cells  Few


 


Urine Bacteria  Rare


 


Hyaline Casts  2


 


Urine Mucus  Few


 


Stool Occult Blood 








 











  04/25/18





  13:54


 


RBC  4.73


 


MCV  83.3


 


MCHC  33.2


 


RDW  14.0


 


MPV  8.6


 


Neutrophils %  40.7 L


 


Lymphocytes %  47.2 H


 


Monocytes %  8.5


 


Eosinophils %  2.8


 


Basophils %  0.8














- Medications


Given in the ED: 


ED Medications














Discontinued Medications














Generic Name Dose Route Start Last Admin





  Trade Name Freq  PRN Reason Stop Dose Admin


 


Acetaminophen  1,000 mg  04/25/18 15:02  04/25/18 15:05





  Ofirmev Injection -  IVPB  04/25/18 15:03  1,000 mg





  ONCE ONE   Administration


 


Sodium Chloride  1,000 ml  04/25/18 14:07  04/25/18 14:18





  Normal Saline -  IV  04/25/18 14:08  1,000 ml





  ONCE ONE   Administration














Medical Decision Making





- Medical Decision Making





04/25/18 19:52


a/p: 59yo female signed out by the prior attending pending ct for eval of R 

flank pain


-pt with bilateral renal calculi in renal pelvis, nonobstructing


-pt with sigmoid diverticulosis, no diverticulitis


-mild UTI, mild suprapubic ttp


-will give abx for uti


-recommend follow up with lantin for rectal bleeding 5 days ago


-stable for d/c to home


-eating and tolerating po in the ED


-stable for d/c to home





*DC/Admit/Observation/Transfer


Diagnosis at time of Disposition: 


 Diverticulosis, UTI (urinary tract infection), Renal calculus, bilateral








- Discharge Dispostion


Disposition: HOME


Condition at time of disposition: Stable


Admit: No





- Prescriptions


Prescriptions: 


Ciprofloxacin HCl [Cipro] 500 mg PO BID #14 tablet


metroNIDAZOLE [Flagyl -] 500 mg PO TID #21 tablet





- Referrals


Referrals: 


Dani Nguyen MD [Primary Care Provider] - 


Maxim Grover MD [Staff Physician] - 





- Patient Instructions





- Post Discharge Activity





- Attestations


Physician Attestion: 





04/25/18 19:52








I, Dr. Emma Morgan, DO, attest that this document has been prepared under 

my direction and personally reviewed by me in its entirety.   I further attest, 

that it accurately reflects all work, treatment, procedures and medical decision

-making performed by me.

## 2019-01-07 ENCOUNTER — HOSPITAL ENCOUNTER (EMERGENCY)
Dept: HOSPITAL 74 - JER | Age: 59
Discharge: HOME | End: 2019-01-07
Payer: COMMERCIAL

## 2019-01-07 VITALS — TEMPERATURE: 97.8 F | SYSTOLIC BLOOD PRESSURE: 136 MMHG | DIASTOLIC BLOOD PRESSURE: 84 MMHG | HEART RATE: 60 BPM

## 2019-01-07 VITALS — BODY MASS INDEX: 33.4 KG/M2

## 2019-01-07 DIAGNOSIS — E78.5: ICD-10-CM

## 2019-01-07 DIAGNOSIS — E11.9: ICD-10-CM

## 2019-01-07 DIAGNOSIS — Z86.73: ICD-10-CM

## 2019-01-07 DIAGNOSIS — R06.09: Primary | ICD-10-CM

## 2019-01-07 DIAGNOSIS — Z87.891: ICD-10-CM

## 2019-01-07 DIAGNOSIS — I10: ICD-10-CM

## 2019-01-07 LAB
ALBUMIN SERPL-MCNC: 3.4 G/DL (ref 3.4–5)
ALP SERPL-CCNC: 74 U/L (ref 45–117)
ALT SERPL-CCNC: 23 U/L (ref 13–61)
ANION GAP SERPL CALC-SCNC: 7 MMOL/L (ref 8–16)
APTT BLD: 26.4 SECONDS (ref 25.2–36.5)
AST SERPL-CCNC: 18 U/L (ref 15–37)
BASOPHILS # BLD: 0.6 % (ref 0–2)
BILIRUB SERPL-MCNC: 1.6 MG/DL (ref 0.2–1)
BUN SERPL-MCNC: 10 MG/DL (ref 7–18)
CALCIUM SERPL-MCNC: 9.2 MG/DL (ref 8.5–10.1)
CHLORIDE SERPL-SCNC: 109 MMOL/L (ref 98–107)
CO2 SERPL-SCNC: 30 MMOL/L (ref 21–32)
CREAT SERPL-MCNC: 0.8 MG/DL (ref 0.55–1.3)
DEPRECATED RDW RBC AUTO: 14.2 % (ref 11.6–15.6)
EOSINOPHIL # BLD: 2.5 % (ref 0–4.5)
GLUCOSE SERPL-MCNC: 117 MG/DL (ref 74–106)
HCT VFR BLD CALC: 39.6 % (ref 32.4–45.2)
HGB BLD-MCNC: 12.7 GM/DL (ref 10.7–15.3)
INR BLD: 1.03 (ref 0.83–1.09)
LYMPHOCYTES # BLD: 43.1 % (ref 8–40)
MCH RBC QN AUTO: 26.4 PG (ref 25.7–33.7)
MCHC RBC AUTO-ENTMCNC: 32 G/DL (ref 32–36)
MCV RBC: 82.4 FL (ref 80–96)
MONOCYTES # BLD AUTO: 6.9 % (ref 3.8–10.2)
NEUTROPHILS # BLD: 46.9 % (ref 42.8–82.8)
PLATELET # BLD AUTO: 212 K/MM3 (ref 134–434)
PMV BLD: 8.3 FL (ref 7.5–11.1)
POTASSIUM SERPLBLD-SCNC: 3.3 MMOL/L (ref 3.5–5.1)
PROT SERPL-MCNC: 6.8 G/DL (ref 6.4–8.2)
PT PNL PPP: 12.1 SEC (ref 9.7–13)
RBC # BLD AUTO: 4.81 M/MM3 (ref 3.6–5.2)
SODIUM SERPL-SCNC: 146 MMOL/L (ref 136–145)
WBC # BLD AUTO: 4.6 K/MM3 (ref 4–10)

## 2019-01-07 NOTE — PDOC
History of Present Illness





- General


Chief Complaint: Blood Pressure Problem


Stated Complaint: BLOOD PRESSURE PROBLEM


Time Seen by Provider: 01/07/19 15:20





- History of Present Illness


Initial Comments: 





01/07/19 17:46


The patient is a 58 year old female, with a significant past medical history of 

diabetes, hypertension, hyperlipidemia, TIAX2, 


multiple ovarian cysts, TIAx2, and multiple abdominal surgeries (CSx2, 

cholecystectomy, partial hysterectomy, ovarian cyst removal, and partial 

colectomy to remove a portion of necrotic bowel), presents with 4 days of 

intermittent shortness of breath, headache, "needle-like" L sided chest pain 

and with elevated blood pressure to 140s-160s overnight. The patient denies any 

shortness of breath, nausea, diaphoresis, loss of consciousness, recent travel, 

recent long flights or car rides, recent fever, cough, runny nose or 

congestion. Additionally the patient complains of neck/thyroid pain. The 

patient has no other complaints at bedside. 


01/07/19 20:34








Past History





- Past Medical History


Allergies/Adverse Reactions: 


 Allergies











Allergy/AdvReac Type Severity Reaction Status Date / Time


 


No Known Allergies Allergy   Verified 04/25/18 13:11











Home Medications: 


Ambulatory Orders





Cholecalciferol (Vitamin D3) [Vitamin D3] 5,000 unit PO DAILY 01/07/19 


Nebivolol HCl [Bystolic] 10 mg PO DAILY 01/07/19 








COPD: No


Diabetes: Yes (DIET CONTROLLED)


HTN: Yes


Hypercholesterolemia: Yes (does not take medication)





- Surgical History


Cholecystectomy: Yes





- Immunization History


Immunization Up to Date: Yes





- Suicide/Smoking/Psychosocial Hx


Smoking Status: No


Smoking History: Never smoked


Have you smoked in the past 12 months: No


Number of Cigarettes Smoked Daily: 0


If you are a former smoker, when did you quit?: 12YRS


Information on smoking cessation initiated: No


Hx Alcohol Use: No


Drug/Substance Use Hx: No


Substance Use Type: None


Hx Substance Use Treatment: No





*Physical Exam





- Vital Signs


 Last Vital Signs











Temp Pulse Resp BP Pulse Ox


 


 98.3 F   74   18   145/76   97 


 


 01/07/19 15:21  01/07/19 15:21  01/07/19 15:21  01/07/19 15:21  01/07/19 15:21














- Physical Exam


Comments: 





01/07/19 20:47


unremarkable


neck fullness


sligght tenderness to thyroid palpation








Moderate Sedation





- Procedure Monitoring


Vital Signs: 


Procedure Monitoring Vital Signs











Temperature  98.3 F   01/07/19 15:21


 


Pulse Rate  74   01/07/19 15:21


 


Respiratory Rate  18   01/07/19 15:21


 


Blood Pressure  145/76   01/07/19 15:21


 


O2 Sat by Pulse Oximetry (%)  97   01/07/19 15:21











ED Treatment Course





- LABORATORY


CBC & Chemistry Diagram: 


 01/07/19 15:37





 01/07/19 15:37





- ADDITIONAL ORDERS


Additional order review: 


 Laboratory  Results











  01/07/19 01/07/19





  15:37 15:37


 


PT with INR   12.10


 


INR   1.03


 


PTT (Actin FS)   26.4


 


Sodium  146 H 


 


Potassium  3.3 L 


 


Chloride  109 H 


 


Carbon Dioxide  30 


 


Anion Gap  7 L 


 


BUN  10 


 


Creatinine  0.8 


 


Creat Clearance w eGFR  > 60 


 


Random Glucose  117 H 


 


Calcium  9.2 


 


Total Bilirubin  1.6 H 


 


AST  18 


 


ALT  23 


 


Alkaline Phosphatase  74 


 


Creatine Kinase  359 H 


 


Creatine Kinase Index  1.6 


 


CK-MB (CK-2)  5.8 H 


 


Troponin I  0.02 


 


Total Protein  6.8 


 


Albumin  3.4 








 











  01/07/19





  15:37


 


RBC  4.81


 


MCV  82.4


 


MCHC  32.0


 


RDW  14.2


 


MPV  8.3


 


Neutrophils %  46.9


 


Lymphocytes %  43.1 H


 


Monocytes %  6.9


 


Eosinophils %  2.5


 


Basophils %  0.6














Medical Decision Making





- Medical Decision Making





01/07/19 18:12


The patient is a 58 year old female, with a significant past medical history of 

diabetes, hypertension, hyperlipidemia, TIAX2, 


multiple ovarian cysts, TIAx2, and multiple abdominal surgeries (CSx2, 

cholecystectomy, partial hysterectomy, ovarian cyst removal, and partial 

colectomy to remove a portion of necrotic bowel), presents with 4 days of 

intermittent shortness of breath, headache, "needle-like" L sided chest pain 

and with elevated blood pressure to 140s-160s overnight. The patient denies any 

current chest pain, shortness of breath, nausea, diaphoresis, loss of 

consciousness, recent travel, recent long flights or car rides, recent fever, 

cough, runny nose or congestion. Additionally the patient complains of neck/

thyroid pain. The patient has no other complaints at bedside. 








ED Course:


Patient seen in fast track. 


cbc, cmp, trop, ptt, pt/inr





concern for acs vs copd vs pna 


cxr, tsh





patient does not carry dx of copd but reports a previous history of smoking.





EKG without acute findings, refer to attending note for report.





All labwork unremarkable.


Patient follows closely with pcp Wendy





will repeat Trop and trend 





01/07/19 20:45


CXR: midline airway, appropriate vascular markings, no blunting of costophrenic 

angle, + cardiomegaly-baseline








01/07/19 22:35








*DC/Admit/Observation/Transfer


Diagnosis at time of Disposition: 


 Exertional dyspnea








- Discharge Dispostion


Disposition: HOME


Condition at time of disposition: Stable


Decision to Admit order: No





- Referrals


Referrals: 


Dani Nguyen MD [Primary Care Provider] - 


Danny Johnston MD [Staff Physician] - 





- Patient Instructions


Printed Discharge Instructions:  DI for Shortness of Breath


Additional Instructions: 


You were seen in the ED for complaints of shortness of breath.





In the ED you were evaluated with labwork and imaging.


Your results were did not have significant findings. 


There does not appear to be an acute need for immediate hospitalization.





You are advised to follow up with your Primary Care Physician within 1 week.


You were given a referral to Cardiology and advised to follow up within 1 week. 





Return to the ED immediately if you experience worsening chest pain, shortness 

of breath, loss of consciousness, palpitations, fever, nausea or vomiting. 








- Post Discharge Activity


Forms/Work/School Notes:  Back to Work

## 2019-01-07 NOTE — PDOC
Attending Attestation





- HPI


HPI: 


This patient is a 58 year old female, with a significant past medical history 

of diabetes, hypertension, hyperlipidemia, TIAX2 (earlier 2018), multiple 

ovarian cysts, and multiple abdominal surgeries (CSx2, cholecystectomy, partial 

hysterectomy, ovarian cyst removal, and partial colectomy to remove a portion 

of necrotic bowel), who presents with 4 days of intermittent shortness of breath

, headache, "needle-like" L sided chest pain and with elevated blood pressure 

to 140's-160's systolic overnight. Patient states that her headache begins at 

her neck, travels up to her right ear and radiates to the back of her head.  

Patient states no sob at rest, sob when she walk a few steps or exerts herself. 

Patient states that she was scheduled for an outpt MRI before but they did not 

have the Valium for her so she needs to reschedule the appt. She was advised to 

go to the ER.  





She denies any recent confusion or slurred speech. The patient denies any 

current  nausea, diaphoresis, loss of consciousness, recent travel, recent long 

flights or car rides, recent fever, cough, runny nose or congestion. 





PCP: Dani Nguyen


Allergies: NKDA


01/07/19 19:55








<Albertina Jacome - Last Filed: 01/07/19 19:55>





- Resident


Resident Name: Rohini Car





- ED Attending Attestation


I have performed the following: I have examined & evaluated the patient, The 

case was reviewed & discussed with the resident, I agree w/resident's findings 

& plan, Exceptions are as noted





- Physicial Exam


PE: 





01/07/19 20:37


wnwd 57 yo female  reports 4 days of shortness of breath and  anterior chest 

pain and headache


she called Dr JORDY Nguyen's office today and they tols her to coame to  the ER 


01/07/19 20:51


head ncat 


eyes srinivas eomi


neck supple,no jvd


lungs cta b/l


cvs [id number]


abd protuberant,no rebound,no guarding


no cva tenderness


ext no edema


skin warm  and dry


neuro axox3,ambulatory,motor strength 5/5,b/l


psych appropriate





- Medical Decision Making





01/07/19 20:54


eKG bradycardia @ 66 bp,m, first degree AV block


01/07/19 22:32


2 NEGATIVE troponins


cxr no infiltrates,no effusions 


pt to follow up w PCP





<La Petty - Last Filed: 01/07/19 22:33>

## 2019-01-07 NOTE — PDOC
Rapid Medical Evaluation


Time Seen by Provider: 01/07/19 15:20


Medical Evaluation: 


 Allergies











Allergy/AdvReac Type Severity Reaction Status Date / Time


 


No Known Allergies Allergy   Verified 04/25/18 13:11











01/07/19 15:21


I have done a brief in-person assessment of this patient. 


The patient presents with a chief complaint of 


elevated blood pressure and headache since last night


States taking medication for HTN with no relief of symptoms


Intermittent chest pain 








Pertinent physical exam findings


NAD


even and unlabored breathing 


heart s1s2


right side of neck with swelling 





I have ordered the following:


ekg, labs


The patient will proceed to the Ed for further evaluation.





DX: elevated blood pressure


     chest pain

## 2019-01-08 NOTE — EKG
Test Reason : 

Blood Pressure : ***/*** mmHG

Vent. Rate : 066 BPM     Atrial Rate : 066 BPM

   P-R Int : 212 ms          QRS Dur : 082 ms

    QT Int : 400 ms       P-R-T Axes : 070 020 011 degrees

   QTc Int : 419 ms

 

SINUS RHYTHM WITH 1ST DEGREE A-V BLOCK

OTHERWISE NORMAL ECG

WHEN COMPARED WITH ECG OF 19-MAR-2015 11:10,

NO SIGNIFICANT CHANGE WAS FOUND

Confirmed by Imer Norwood MD ([id number]) on 1/8/2019 10:03:13 AM

 

Referred By:             Confirmed By:Imer Norwood MD

## 2019-08-01 ENCOUNTER — HOSPITAL ENCOUNTER (OUTPATIENT)
Dept: HOSPITAL 74 - JRADIR | Age: 59
Discharge: HOME | End: 2019-08-01
Attending: SPECIALIST
Payer: COMMERCIAL

## 2019-08-01 DIAGNOSIS — E04.1: Primary | ICD-10-CM

## 2019-08-01 PROCEDURE — 0G9H3ZX DRAINAGE OF RIGHT THYROID GLAND LOBE, PERCUTANEOUS APPROACH, DIAGNOSTIC: ICD-10-PCS | Performed by: RADIOLOGY

## 2019-08-02 NOTE — PATH
Cytology Non-Gynecological Report



Patient Name:  KRYSTA CHRISTIANSON

Accession #:  

[facility]. Rec. #:  [id number]                                                        

   /Age/Gender:  1960 (Age: 59) / F

Account:  [id number]                                                          

             Location: RADIOLOGY INTER

Taken:  2019

Received:  2019

Reported:  2019

Physicians:  SHOLA Bear M.D.

  



Specimen(s) Received

 RIGHT THYROID FNA 





Clinical History

Right, 4.29 x 2.62 x 3.49 cm







Final Diagnosis

THYROID, RIGHT, FINE NEEDLE ASPIRATION:

SATISFACTORY FOR EVALUATION.

BETHESDA CLASS II: BENIGN.

CYTOLOGIC FINDINGS ARE CONSISTENT WITH A BENIGN FOLLICULAR NODULE WITH

POST-HEMORRHAGIC CHANGE. 

SMALL FOLLICULAR CELLS IN A BACKGROUND OF COLLOID AND SCATTERED

HEMOSIDERIN-LADEN MACROPHAGES.





***Electronically Signed***

Shanna Davies M.D.





Gross Description

Received are eight direct smears, four of which are air-dried and Diff-Quik

stained, and four of which are alcohol fixed and Pap stained.  Also received is

20 ml of bloody formalin from which one cellblock is prepared.

## 2019-08-23 ENCOUNTER — HOSPITAL ENCOUNTER (INPATIENT)
Dept: HOSPITAL 74 - JASU-SURG | Age: 59
LOS: 4 days | Discharge: HOME | DRG: 404 | End: 2019-08-27
Attending: SPECIALIST | Admitting: SPECIALIST
Payer: COMMERCIAL

## 2019-08-23 VITALS — BODY MASS INDEX: 34.2 KG/M2

## 2019-08-23 DIAGNOSIS — E83.51: ICD-10-CM

## 2019-08-23 DIAGNOSIS — I10: ICD-10-CM

## 2019-08-23 DIAGNOSIS — E78.5: ICD-10-CM

## 2019-08-23 DIAGNOSIS — E83.39: ICD-10-CM

## 2019-08-23 DIAGNOSIS — E04.2: Primary | ICD-10-CM

## 2019-08-23 DIAGNOSIS — E66.01: ICD-10-CM

## 2019-08-23 DIAGNOSIS — E87.6: ICD-10-CM

## 2019-08-23 DIAGNOSIS — J95.821: ICD-10-CM

## 2019-08-23 DIAGNOSIS — R13.10: ICD-10-CM

## 2019-08-23 DIAGNOSIS — E11.9: ICD-10-CM

## 2019-08-23 LAB
ALBUMIN SERPL-MCNC: 3 G/DL (ref 3.4–5)
ALP SERPL-CCNC: 71 U/L (ref 45–117)
ALT SERPL-CCNC: 54 U/L (ref 13–61)
ANION GAP SERPL CALC-SCNC: 9 MMOL/L (ref 8–16)
AST SERPL-CCNC: 83 U/L (ref 15–37)
BILIRUB SERPL-MCNC: 1.6 MG/DL (ref 0.2–1)
BUN SERPL-MCNC: 12 MG/DL (ref 7–18)
CALCIUM SERPL-MCNC: 8.8 MG/DL (ref 8.5–10.1)
CHLORIDE SERPL-SCNC: 108 MMOL/L (ref 98–107)
CO2 SERPL-SCNC: 27 MMOL/L (ref 21–32)
CREAT SERPL-MCNC: 1 MG/DL (ref 0.55–1.3)
DEPRECATED RDW RBC AUTO: 13.8 % (ref 11.6–15.6)
GLUCOSE SERPL-MCNC: 133 MG/DL (ref 74–106)
HCT VFR BLD CALC: 36 % (ref 32.4–45.2)
HGB BLD-MCNC: 12 GM/DL (ref 10.7–15.3)
MAGNESIUM SERPL-MCNC: 1.6 MG/DL (ref 1.8–2.4)
MCH RBC QN AUTO: 27.3 PG (ref 25.7–33.7)
MCHC RBC AUTO-ENTMCNC: 33.4 G/DL (ref 32–36)
MCV RBC: 81.7 FL (ref 80–96)
PHOSPHATE SERPL-MCNC: 1.8 MG/DL (ref 2.5–4.9)
PLATELET # BLD AUTO: 209 K/MM3 (ref 134–434)
PMV BLD: 8.8 FL (ref 7.5–11.1)
POTASSIUM SERPLBLD-SCNC: 2.8 MMOL/L (ref 3.5–5.1)
PROT SERPL-MCNC: 6.1 G/DL (ref 6.4–8.2)
RBC # BLD AUTO: 4.4 M/MM3 (ref 3.6–5.2)
SODIUM SERPL-SCNC: 144 MMOL/L (ref 136–145)
WBC # BLD AUTO: 10.2 K/MM3 (ref 4–10)

## 2019-08-23 PROCEDURE — 0GTK0ZZ RESECTION OF THYROID GLAND, OPEN APPROACH: ICD-10-PCS | Performed by: SPECIALIST

## 2019-08-23 PROCEDURE — 5A1935Z RESPIRATORY VENTILATION, LESS THAN 24 CONSECUTIVE HOURS: ICD-10-PCS | Performed by: FAMILY MEDICINE

## 2019-08-23 PROCEDURE — 0BH17EZ INSERTION OF ENDOTRACHEAL AIRWAY INTO TRACHEA, VIA NATURAL OR ARTIFICIAL OPENING: ICD-10-PCS | Performed by: FAMILY MEDICINE

## 2019-08-23 RX ADMIN — SODIUM CHLORIDE, POTASSIUM CHLORIDE, SODIUM LACTATE AND CALCIUM CHLORIDE SCH MLS: 600; 310; 30; 20 INJECTION, SOLUTION INTRAVENOUS at 17:16

## 2019-08-23 RX ADMIN — MUPIROCIN SCH APPLIC: 20 OINTMENT TOPICAL at 21:39

## 2019-08-23 RX ADMIN — PROPOFOL SCH MLS: 10 INJECTION, EMULSION INTRAVENOUS at 16:10

## 2019-08-23 RX ADMIN — ENOXAPARIN SODIUM SCH MG: 30 INJECTION SUBCUTANEOUS at 19:28

## 2019-08-23 RX ADMIN — DEXAMETHASONE SODIUM PHOSPHATE SCH MG: 10 INJECTION, SOLUTION INTRAMUSCULAR; INTRAVENOUS at 21:39

## 2019-08-23 RX ADMIN — CHLORHEXIDINE GLUCONATE SCH APPLIC: 213 SOLUTION TOPICAL at 21:39

## 2019-08-23 NOTE — HP
History & Physical Update





- Physical


Physical: No Change





- Assessment


Assessment: No Change





- Plan


Plan: No Change (Patient with bilateral enlarged thyroid nodules, right greater 

than left , with mediastinal extention , and tracheal deviation.  FNA biopsy 

suggestive of benign multinodular thyroid goiter.  Patient has been explained, 

of the procedure , including removal of mediastinal goiter , cervical approach,

  possible postoperative difficulty breathing, tracheomalacia, need for blood 

transfusion etc., All questions answered.  Plan : Nearbtotal / total 

thyroidectomy.)

## 2019-08-23 NOTE — CONSULT
Consultation: 


REQUESTING PROVIDER: Dr. Kline





CONSULT Service: ICU resident.





HISTORY OF PRESENT ILLNESS:


Patient is a 59 year old female with history of hypertension, hyperlipidemia, 

diabetes mellitus, TIA (in 2018) presents s/p total thyroidectomy, removal of 

mediastinal goiter, cervical approach, with complex repair of neck wound (10cms 

in length, in layers). Discussed with anesthesiology, given difficult airway, 

with likely edema, decision was made for patient to remain intubated tonight, 

until possible awake extubation with anesthesiology tomorrow.  


Patient currently hemodynamically stable, sedated on Propofol drip. Vent 

settings (per anesthesia): FiO2 50%, RR12, Tidal Volume 500cc, PEEP 0.





REVIEW OF SYSTEMS:


As per HPI. Unable to obtain further given patient's clinical condition. 





PHYSICAL EXAMINATION





 Vital Signs - 24 hr











  08/23/19 08/23/19 08/23/19





  10:18 16:05 16:20


 


Temperature 97.7 F 97.4 F L 


 


Pulse Rate 69 96 H 96 H


 


Respiratory 16 20 18





Rate   


 


Blood Pressure 149/85 136/72 153/81


 


O2 Sat by Pulse 98 100 100





Oximetry (%)   














  08/23/19 08/23/19 08/23/19





  16:35 16:50 17:05


 


Temperature   


 


Pulse Rate 117 H 98 H 99 H


 


Respiratory 20 22 H 22 H





Rate   


 


Blood Pressure 191/101 H 158/87 166/87


 


O2 Sat by Pulse 100 100 100





Oximetry (%)   














  08/23/19 08/23/19 08/23/19





  17:20 17:35 17:50


 


Temperature   


 


Pulse Rate 75 74 76


 


Respiratory 20 18 20





Rate   


 


Blood Pressure 131/80 110/62 109/70


 


O2 Sat by Pulse 100 100 100





Oximetry (%)   














  08/23/19 08/23/19 08/23/19





  18:05 18:20 18:35


 


Temperature   


 


Pulse Rate 74 76 74


 


Respiratory 20 18 18





Rate   


 


Blood Pressure 110/60 108/60 111/68


 


O2 Sat by Pulse 100 100 100





Oximetry (%)   














  08/23/19 08/23/19 08/23/19





  18:40 19:05 19:35


 


Temperature   


 


Pulse Rate 74 76 68


 


Respiratory 20 18 18





Rate   


 


Blood Pressure  114/66 136/70


 


O2 Sat by Pulse 100 100 100





Oximetry (%)   














  08/23/19





  19:55


 


Temperature 97.9 F


 


Pulse Rate 66


 


Respiratory 16





Rate 


 


Blood Pressure 131/84


 


O2 Sat by Pulse 100





Oximetry (%) 








GENERAL: Intubated, sedated. In no acute distress.


HEENT: NC/AT, PERRL, sclera anicteric, conjunctiva clear.


NECK: Supple without lymphadenopathy. Negative stridor. 


LUNGS: Breath sounds equal, clear to auscultation bilaterally. No wheezes, and 

no crackles. No accessory muscle use.


HEART: Regular rate and rhythm, normal S1 and S2 without murmur, rub or gallop.


ABDOMEN: Soft, nontender, not distended, normoactive bowel sounds, no guarding, 

no rebound, no masses.  No hepatomegaly or splenomegaly. 


EXTREMITIES: 2+ pulses, warm, well-perfused. No cyanosis. No peripheral edema.


NEUROLOGICAL: Sedated on Propofol drip. 


SKIN: Warm, dry. Anterior neck surgical site clean, dry. CARLOS drain right neck.





 Laboratory Results - last 24 hr











  08/23/19 08/23/19 08/23/19





  09:56 10:31 11:00


 


Potassium  3.2 L  


 


POC Glucometer   95 


 


Blood Type    Cancelled


 


Antibody Screen    Cancelled














  08/23/19





  12:55


 


Potassium 


 


POC Glucometer 


 


Blood Type  A POSITIVE


 


Antibody Screen  Negative








Active Medications











Generic Name Dose Route Start Last Admin





  Trade Name Freq  PRN Reason Stop Dose Admin


 


Chlorhexidine Gluconate  1 applic  08/23/19 22:00  





  Hibiclens For Decolonization -  TP   





  HS QUIQUE   





     





     





     





     


 


Enoxaparin Sodium  30 mg  08/23/19 16:26  08/23/19 19:28





  Lovenox -  SQ   30 mg





  DAILY QUIQUE   Administration





     





     





     





     


 


Fentanyl  50 mcg  08/23/19 16:26  08/23/19 17:10





  Sublimaze Injection -  IVPUSH   50 mcg





  [id number] PRN   Administration





  PAIN-PACU ORDER X 4 DOSES ONLY   





     





     





     


 


Lactated Ringer's  1,000 mls @ 75 mls/hr  08/23/19 16:26  08/23/19 17:16





  Lactated Ringers Solution  IV   300 mls





  ASDIR QUIQUE   Administration





     





     





     





     


 


Propofol  1,000,000 mcg in 100 mls @ 2.654 mls/hr  08/23/19 16:26  08/23/19 16:

10





  Diprivan -  IVPB   100 mls





  TITR QUIQUE   Administration





     





     





  Protocol   





  5 MCG/KG/MIN   


 


Mupirocin  1 applic  08/23/19 22:00  





  Bactroban Ointment (For Decolonization) -  NS  08/28/19 21:59  





  BID QUIQUE   





     





     





     





     


 


Ondansetron HCl  4 mg  08/23/19 16:26  





  Zofran Injection  IVPUSH   





  Q6H PRN   





  NAUSEA AND/OR VOMITING   





     





     





     











ASSESSMENT/PLAN:


Patient is a 59 year old female with history of hypertension, hyperlipidemia, 

diabetes mellitus, TIA (in 2018) presents s/p total thyroidectomy, removal of 

mediastinal goiter, cervical approach.





Neurologic


 -Patient sedated with Propfol drip.


 -Maintain sedated for ventilator synchrony.





Cardiac


History of hypertension


 -Patient currently normotensive. 


 -Will reinstate patient's home Amlodipine once extubated. 


 -Will need to discuss with surgery regarding reinstating patient's home 

Aspirin.





Pulmonary


 -Patient intubated. Vent settings (per anesthesia): FiO2 50%, RR12, Tidal 

Volume 500cc, PEEP 0


 -Per anesthesia, plan for awake extubation tomorrow, with anesthesia present 

at bedside.  


 -Decadron 10mg IV [id number]


 -Maintain oxygen saturation greater than 90%





Gastrointestinal


 -NPO while intubated.


 -Swallow evaluation once patient extubated. 





Endocrine


S/P total thyroidectomy, removal of mediastinal goiter


 -General surgery recommendations (Dr. Kline) appreciated. 





Infectious disease


 -WBC 10.2, likely reactive s/p surgical procedure. However patient remains 

afebrile. 


 -Will culture, and consider initiating antibiotics if patient develops fever, 

or WBC count elevates. 





Renal


 -Hypokalemia. Replete with KCL IV. Follow repeat CMP. 





FEN


 -IV Lactated Ringer's at 75mL/ hour


 -Hypokalemia, hypomagnesemia, hypophosphatemia. Follow CMP, replete as 

necessary


 -NPO





Prophylaxis


 -Lovenox 30mg subq daily





Disposition: 


We will continue to follow the patient. Thank you for this consultative 

opportunity.











Visit type





- Emergency Visit


Emergency Visit: No





- New Patient


This patient is new to me today: Yes


Date on this admission: 08/24/19





- Critical Care


Critical Care patient: Yes


Total Critical Care Time (in minutes): 35


Critical Care Statement: The care of this patient involved high complexity 

decision making to prevent further life threatening deterioration of the patient

's condition and/or to evaluate & treat vital organ system(s) failure or risk 

of failure.





ATTENDING PHYSICIAN STATEMENT





I saw and evaluated the patient.


I reviewed the resident's note and discussed the case with the resident.


I agree with the resident's findings and plan as documented.








SUBJECTIVE:








OBJECTIVE:








ASSESSMENT AND PLAN:

## 2019-08-23 NOTE — OP
Operative Note





- Note:


Operative Date: 08/23/19


Pre-Operative Diagnosis: Multinodular thyroid goiter,.  Mediastinal goiter,.  

Tracheal compression and shift to the left of midline,.  Dysphagia.  

Hypertension


Operation: Total thyroidectomy.


Findings: 





   Multinodular thyroid goiter, with a large nodule on the right lobe extending 

to the upper mediastinum,


   Circumferential compression of the trachea with narrowing, by 

circumferential enlargement of the thyroid gland,


  No evidence of tracheomalacia.





Post-Operative Diagnosis: Same as Pre-op


Surgeon: Karen Kline


Assistant: Carlos Navarro


Anesthesia: General


Specimens Removed: Total thyroidectomy.


Estimated Blood Loss (mls): 40


Drains & Tubes with Location: No 14 Drain


Operative Report Dictated: Yes

## 2019-08-24 LAB
ALBUMIN SERPL-MCNC: 2.5 G/DL (ref 3.4–5)
ALP SERPL-CCNC: 63 U/L (ref 45–117)
ALT SERPL-CCNC: 39 U/L (ref 13–61)
ANION GAP SERPL CALC-SCNC: 10 MMOL/L (ref 8–16)
AST SERPL-CCNC: 43 U/L (ref 15–37)
BILIRUB SERPL-MCNC: 1.3 MG/DL (ref 0.2–1)
BUN SERPL-MCNC: 10.9 MG/DL (ref 7–18)
CALCIUM SERPL-MCNC: 7.5 MG/DL (ref 8.5–10.1)
CHLORIDE SERPL-SCNC: 115 MMOL/L (ref 98–107)
CO2 SERPL-SCNC: 22 MMOL/L (ref 21–32)
CREAT SERPL-MCNC: 0.6 MG/DL (ref 0.55–1.3)
DEPRECATED RDW RBC AUTO: 13.9 % (ref 11.6–15.6)
GLUCOSE SERPL-MCNC: 136 MG/DL (ref 74–106)
HCT VFR BLD CALC: 33.1 % (ref 32.4–45.2)
HGB BLD-MCNC: 11.6 GM/DL (ref 10.7–15.3)
MAGNESIUM SERPL-MCNC: 1.6 MG/DL (ref 1.8–2.4)
MCH RBC QN AUTO: 28.5 PG (ref 25.7–33.7)
MCHC RBC AUTO-ENTMCNC: 35.2 G/DL (ref 32–36)
MCV RBC: 81 FL (ref 80–96)
PHOSPHATE SERPL-MCNC: 2.2 MG/DL (ref 2.5–4.9)
PLATELET # BLD AUTO: 222 K/MM3 (ref 134–434)
PMV BLD: 9.4 FL (ref 7.5–11.1)
POTASSIUM SERPLBLD-SCNC: 3.1 MMOL/L (ref 3.5–5.1)
PROT SERPL-MCNC: 5.5 G/DL (ref 6.4–8.2)
RBC # BLD AUTO: 4.09 M/MM3 (ref 3.6–5.2)
SODIUM SERPL-SCNC: 147 MMOL/L (ref 136–145)
WBC # BLD AUTO: 9.3 K/MM3 (ref 4–10)

## 2019-08-24 RX ADMIN — PROPOFOL SCH MLS/HR: 10 INJECTION, EMULSION INTRAVENOUS at 09:34

## 2019-08-24 RX ADMIN — DEXAMETHASONE SODIUM PHOSPHATE SCH MG: 10 INJECTION, SOLUTION INTRAMUSCULAR; INTRAVENOUS at 05:59

## 2019-08-24 RX ADMIN — CALCIUM CARBONATE SCH MG: 1250 SUSPENSION ORAL at 23:30

## 2019-08-24 RX ADMIN — MUPIROCIN SCH APPLIC: 20 OINTMENT TOPICAL at 22:49

## 2019-08-24 RX ADMIN — POTASSIUM CHLORIDE SCH MLS/HR: 7.46 INJECTION, SOLUTION INTRAVENOUS at 03:30

## 2019-08-24 RX ADMIN — POTASSIUM CHLORIDE SCH MLS/HR: 7.46 INJECTION, SOLUTION INTRAVENOUS at 02:00

## 2019-08-24 RX ADMIN — ENOXAPARIN SODIUM SCH MG: 30 INJECTION SUBCUTANEOUS at 09:31

## 2019-08-24 RX ADMIN — SODIUM CHLORIDE, POTASSIUM CHLORIDE, SODIUM LACTATE AND CALCIUM CHLORIDE SCH: 600; 310; 30; 20 INJECTION, SOLUTION INTRAVENOUS at 18:58

## 2019-08-24 RX ADMIN — DEXAMETHASONE SODIUM PHOSPHATE SCH MG: 10 INJECTION, SOLUTION INTRAMUSCULAR; INTRAVENOUS at 12:39

## 2019-08-24 RX ADMIN — CHLORHEXIDINE GLUCONATE SCH APPLIC: 213 SOLUTION TOPICAL at 22:49

## 2019-08-24 RX ADMIN — MUPIROCIN SCH APPLIC: 20 OINTMENT TOPICAL at 09:31

## 2019-08-24 RX ADMIN — POTASSIUM CHLORIDE SCH MLS/HR: 7.46 INJECTION, SOLUTION INTRAVENOUS at 11:47

## 2019-08-24 RX ADMIN — PROPOFOL SCH MLS/HR: 10 INJECTION, EMULSION INTRAVENOUS at 09:40

## 2019-08-24 RX ADMIN — DEXAMETHASONE SODIUM PHOSPHATE SCH MG: 10 INJECTION, SOLUTION INTRAMUSCULAR; INTRAVENOUS at 20:28

## 2019-08-24 RX ADMIN — MORPHINE SULFATE PRN MG: 2 INJECTION, SOLUTION INTRAMUSCULAR; INTRAVENOUS at 20:28

## 2019-08-24 RX ADMIN — POTASSIUM CHLORIDE SCH MLS/HR: 7.46 INJECTION, SOLUTION INTRAVENOUS at 00:14

## 2019-08-24 RX ADMIN — POTASSIUM CHLORIDE SCH MLS/HR: 7.46 INJECTION, SOLUTION INTRAVENOUS at 12:37

## 2019-08-24 RX ADMIN — SODIUM CHLORIDE, POTASSIUM CHLORIDE, SODIUM LACTATE AND CALCIUM CHLORIDE SCH MLS/HR: 600; 310; 30; 20 INJECTION, SOLUTION INTRAVENOUS at 09:38

## 2019-08-24 NOTE — PN
Progress Note, Physician


Chief Complaint: 


s/p Total Thyroidectomy


History of Present Illness: 





Previous notes and events reviewed


patient sedated


mechincally ventilated


patient to be extubated by anesthesia this afternoon


IV propafol for sedation


K 3.1





- Current Medication List


Current Medications: 


Active Medications





Chlorhexidine Gluconate (Hibiclens For Decolonization -)  1 applic TP HS [facility]


   Last Admin: 08/23/19 21:39 Dose:  1 applic


Dexamethasone Sodium Phosphate (Decadron Injection -)  10 mg IVPB Q8H [facility]


   Last Admin: 08/24/19 05:59 Dose:  10 mg


Enoxaparin Sodium (Lovenox -)  30 mg SQ DAILY [facility]


   Last Admin: 08/24/19 09:31 Dose:  30 mg


Fentanyl (Sublimaze Injection -)  100 mcg IVPUSH Q1H PRN


   PRN Reason: PAIN LEVEL 7 - 10


   Stop: 08/25/19 08:59


Fentanyl (Sublimaze Injection -)  50 mcg IVPUSH Q1H PRN


   PRN Reason: PAIN LEVEL 4 - 6


   Stop: 08/25/19 08:57


Lactated Ringer's (Lactated Ringers Solution)  1,000 mls @ 75 mls/hr IV ASDIR 

[facility]


   Last Admin: 08/24/19 09:38 Dose:  75 mls/hr


Propofol (Diprivan -)  1,000,000 mcg in 100 mls @ 2.654 mls/hr IVPB TITR [facility]; 

Protocol


   Last Admin: 08/24/19 09:34 Dose:  70 mcg/kg/min, 37.149 mls/hr


Fentanyl 500 mcg/ Dextrose  100 mls @ 10 mls/hr IVPB TITR [facility]


   Last Admin: 08/24/19 09:29 Dose:  50 mcg/hr, 10 mls/hr


Mupirocin (Bactroban Ointment (For Decolonization) -)  1 applic NS BID [facility]


   Stop: 08/28/19 21:59


   Last Admin: 08/24/19 09:31 Dose:  1 applic


Ondansetron HCl (Zofran Injection)  4 mg IVPUSH Q6H PRN


   PRN Reason: NAUSEA AND/OR VOMITING











- Objective


Vital Signs: 


 Vital Signs











Temperature  98.7 F   08/24/19 06:00


 


Pulse Rate  75   08/24/19 08:00


 


Respiratory Rate  21 H  08/24/19 08:28


 


Blood Pressure  117/69   08/24/19 08:00


 


O2 Sat by Pulse Oximetry (%)  99   08/24/19 08:28











Constitutional: Yes: Other (sedated)


HENT: Yes: Atraumatic


Neck: Yes: Other (surgical incision)


Cardiovascular: Yes: Regular Rate and Rhythm


Respiratory: Yes: Regular, CTA Bilaterally, Mechanically Ventilated


Gastrointestinal: Yes: Normal Bowel Sounds, Soft


Genitourinary: Yes: Bal Present


Musculoskeletal: Yes: Muscle Weakness


Extremities: Yes: WNL


Edema: No


Integumentary: Yes: Other (surgical incision, base of neck)


Wound/Incision: Yes: Open to air


Neurological: Yes: Other (sedated)


Labs: 


 CBC, BMP





 08/24/19 05:50 





 08/24/19 05:50 











Problem List





- Problems


(1) Hypertension


Assessment/Plan: 


-BP meds on hold


-monitor BP


Code(s): I10 - ESSENTIAL (PRIMARY) HYPERTENSION   





(2) Thyromegaly


Assessment/Plan: 


-POD #1 total thyroidectomy/mediastinal goiter


-intubated and sedated--anesthesia attempt to extubate today


-mechanically ventilated-, PEEP 5, O2 50%, rate 12


-CARLOS x 1 noted with no drainage


-surgery on board and recommendation appreciated


-afebrile and no leukocytosis post surgery


Code(s): E04.9 - NONTOXIC GOITER, UNSPECIFIED   





(3) Hypokalemia


Assessment/Plan: 


-K 3.1


-KCl 10mEq IVPB x 2


-monitor electrolyte and repelete as needed


Code(s): E87.6 - HYPOKALEMIA   





Assessment/Plan





see problem list


dvt ppx

## 2019-08-24 NOTE — OP
DATE OF OPERATION:  08/23/2019

 

PREOPERATIVE DIAGNOSES:  Multinodular thyroid goiter, mediastinal goiter with

tracheal compression and shift of the trachea to the left of the midline, dysphagia,

and hypertension.

 

POSTOPERATIVE DIAGNOSES:

1.  Multinodular thyroid goiter with a large nodule on the right lobe of the thyroid

extending down to the upper mediastinum.

2.  Circumferential compression of the trachea with narrowing by circumferential

enlargement of the thyroid gland with multinodular goiter, with no evidence of

tracheomalacia.

 

OPERATIVE PROCEDURES:  

1.  Total thyroidectomy, including substernal thyroid by the cervical approach.

2.  Removal of mediastinal goiter.

3.  Repair of the neck wound.

 

SURGEON:  Yue Kline MD 

 

ASSISTANT:  MITCHELL Fuchs 

 

ANESTHESIA:  General anesthesia.

 

OPERATIVE DESCRIPTION:  This 59-year-old woman was diagnosed to have large,

multinodular thyroid goiter with compression and narrowing of the trachea with

deviation of the trachea to the left as well as a large mediastinal component.  The

cytology of the goiter was benign, [place] class II; however, because patient had

airway difficulty as well as dysphagia and change in voice, the patient was brought

in for thyroidectomy.  Risks, benefits, and complications were extensively discussed

with the patient. 

 

Patient was brought to the operating room.  General anesthesia was administered via

endotracheal tube.  There was difficulty for the anesthesiologist to intubate the

patient; however, this was done using the fiberoptic scope.  The patient was

positioned with the neck in extension.  The neck was painted and draped.  Time-out

was called.  Patient was given a gram of Ancef, and a transverse skin incision was

made 1 fingerbreadth above the clavicle from 1 sternocleidomastoid muscle to another.

 The incision was deepened through the skin, subcutaneous tissue, and the platysma

muscle.  Superior, inferior skin flaps were then raised between the platysma and the

deep cervical fascia, superiorly up to the hyoid bone, inferiorly anterior to below

the clavicle on either side.  Laterally, sternocleidomastoid muscle was freed from

the flap on either side, and dissection was carried all the way to the trapezius

muscle.

 

The strap muscles were then divided in the midline from the hyoid bone down to the

suprasternal notch.  The thyroid gland was exposed.  There was a large, multinodular

thyroid gland.  This was carefully  first on the right, then on the left. 

On the right side, there was a nodule in the upper pole, right lobe of the thyroid

gland as well as in the body of the thyroid gland, which was wrapped around the

thyroid cartilage on the trachea.  This was carefully mobilized and brought

anteriorly.  There was a large nodule on the inferior pole of the right lobe of the

thyroid gland, which extended below the clavicle down into the upper mediastinum. 

This was also carefully delivered into the wound in the neck and brought above the

clavicle.  Next, the left lobe of the thyroid gland was also mobilized.  There was a

large nodule wrapped around the trachea going all the way to the prevertebral and

paravertebral space, around the trachea and the esophagus.  This was also mobilized

anteriorly and brought into the wound.

 

Once this was done, the thyroid gland was then freed from its attachment.  This was

started from the upper pole of the left lobe of the thyroid gland.  The superior

thyroid vessels were divided as close to the gland as possible between clips and the

LigaSure.  The left superior parathyroid gland was identified and preserved intact

with its blood supply.  The inferior pole of the thyroid gland on the left was also

then mobilized dividing the branches of the inferior thyroid artery as close to the

gland as possible again between clips and the Harmonic scalpel.  The middle thyroid

vein was also divided between clips and the LigaSure.  The inferior parathyroid gland

was also identified and preserved with its blood supply intact.  The gland was then

mobilized medially.  The left recurrent laryngeal nerve was also identified and

preserved all along its course.  The gland was then mobilized further medially

dividing the Berry ligament between clips and mobilizing the gland in front of the

pretracheal fascia, across the isthmus and then towards the right.  Hemostasis was

maintained throughout the procedure.

 

Next, the right lobe of the thyroid gland was then mobilized starting from the

mediastinal nodule and the goiter.  All vessels were divided as close to the gland as

possible between clips and the LigaSure.  The right parathyroid gland was also

identified and preserved with its blood supply intact.  The upper pole of the right

lobe of the thyroid gland was similarly mobilized dividing the vessels as close to

the gland as possible and mobilizing it medially towards the trachea.  The middle

thyroid vein on the right side was also divided between clips and the LigaSure.  The

gland was then mobilized medially toward the Berry ligament.  The inferior thyroid

vessel with its branches going to the parathyroid was preserved throughout the

procedure.  The right recurrent laryngeal nerve was identified and followed all the

way to its insertion under the cricothyroid muscle, and this was maintained intact. 

The thyroidectomy was completed, and the specimen was sent to Pathology, and this was

suggestive of a multinodular goiter, and frozen section was not done.

 

Hemostasis was satisfactory at the completion of the procedure.  The wound was

irrigated.  There was no bleeding.  Both recurrent laryngeal nerves were intact, and

the parathyroid gland was preserved looking normally without any hematoma and any

evidence of necrosis.  A No. 14 drain was left into the wound and brought out through

a stab wound on the right side of the neck.  The strap muscles were approximated with

buried interrupted 3-0 Vicryl sutures.  The platysma was similarly approximated with

buried interrupted 3-0 Vicryl sutures.  Subcutaneous fat was approximated with 4-0

Biosyn sutures, and the skin was approximated with running 4-0 subcuticular fashion. 

Sponge count, instrument count were correct.  The neck wound was repaired by

approximating the skin with careful approximation with 4-0 subcuticular sutures. 

Estimated blood loss was about 40 mL.  An x-ray of the neck and upper chest was then

taken to make certain that there was no foreign body.  Sponge count was correct at

the completion of the procedure.  A suture was placed with 2-0 Prolene around the

drain exit, and a small dressing was applied.  Dermabond was applied across the skin

edges.  

 

 

SHOLA [id number]

DD: 08/23/2019 16:59

DT: 08/24/2019 06:43

Job #:  [id number]

## 2019-08-24 NOTE — CONSULT
Consult - text type





- Consultation


Consultation Note: 





PULMONARY/CRITICAL CARE MEDICINE CONSULT





HPI: Briefly, 60 y/o F w/HTN, HLD, DM and prior TIA who underwent total 

thyroidectomy/mediastinal goiter by Dr Kline yesterday. She was a difficult 

intubation and there was concern for post-op airway edema therefore she was 

admitted to the ICU o/n and placed on steroids with the plan to extubate this 

afternoon w/anesthesia.





She is currently heavily sedated on Propofol and Midazolam








 Current Medications





Chlorhexidine Gluconate (Hibiclens For Decolonization -)  1 applic TP HS [facility]


   Last Admin: 08/23/19 21:39 Dose:  1 applic


Dexamethasone Sodium Phosphate (Decadron Injection -)  10 mg IVPB Q8H [facility]


   Last Admin: 08/24/19 05:59 Dose:  10 mg


Enoxaparin Sodium (Lovenox -)  30 mg SQ DAILY [facility]


   Last Admin: 08/23/19 19:28 Dose:  30 mg


Fentanyl (Sublimaze Injection -)  50 mcg IVPUSH [id number] PRN


   PRN Reason: PAIN-PACU ORDER X 4 DOSES ONLY


   Last Admin: 08/23/19 17:10 Dose:  50 mcg


Lactated Ringer's (Lactated Ringers Solution)  1,000 mls @ 75 mls/hr IV ASDIR 

[facility]


   Last Admin: 08/23/19 17:16 Dose:  300 mls


Propofol (Diprivan -)  1,000,000 mcg in 100 mls @ 2.654 mls/hr IVPB TITR [facility]; 

Protocol


   Last Titration: 08/24/19 01:45 Dose:  70 mcg/kg/min, 37.149 mls/hr


Midazolam HCl 100 mg/ Sodium (Chloride)  100 mls @ 1 mls/hr IVPB TITR [facility]; 

Protocol


   Last Titration: 08/24/19 01:45 Dose:  6 mg/hr, 6 mls/hr


Mupirocin (Bactroban Ointment (For Decolonization) -)  1 applic NS BID [facility]


   Stop: 08/28/19 21:59


   Last Admin: 08/23/19 21:39 Dose:  1 applic


Ondansetron HCl (Zofran Injection)  4 mg IVPUSH Q6H PRN


   PRN Reason: NAUSEA AND/OR VOMITING








 Vital Signs











Temp  98.7 F   08/24/19 06:00


 


Pulse  74   08/24/19 08:00


 


Resp  21 H  08/24/19 08:28


 


BP  117/69   08/24/19 08:00


 


Pulse Ox  99   08/24/19 08:28








 Intake & Output











 08/23/19 08/24/19 08/24/19





 18:59 06:59 18:59


 


Intake Total 2800 2014.8 


 


Output Total 480 4100 


 


Balance 2320 -2085.2 


 


Weight  93.123 kg 


 


Intake:   


 


  IV 2800 1328.8 


 


    DIPRIVAN - 1,000,000 mcg  462.9 





    In 100 ml @ 5 MCG/KG/MIN   





    2.654 mls/hr IVPB TITR   





    QUIQUE Rx#:[id number]   


 


    Lactated Ringers Solution  823 





    1,000 ml @ 75 mls/hr IV   





    ASDIR [facility] Rx#:[id number]   


 


    Versed - 100 mg In Normal  42.9 





    Saline - 100 ml @ 1 MG/   





    HR 1 mls/hr IVPB TITR [facility]   





    Rx#:[id number]   


 


  IVPB  686 


 


Output:   


 


  Urine 300 4100 


 


    Bal  1800 


 


  Estimated Blood Loss 30  


 


  Other 150  


 


Other:   


 


  Voiding Method  Indwelling Catheter Indwelling Catheter


 


  Bowel Movement  No 


 


  Weight Measurement Method  Built in [facility] 











EXAM:


neuro: deeply sedated, withdraws to pain


HEENT: PERRL, dry MM, neck soft, skin incision clean and approximated, covered 

in derm-a-bond


Lungs: clear


Heart: RRR, S1 S2


Abd: obese, soft


Ext: warm, no edema





 CBC, BMP





 08/24/19 05:50 





 08/24/19 05:50 








ASSESSMENT:


-Benign thyroid/substernal goiter s/p resection


-Post-operative respiratory failure


-History of a difficult airway with possible airway edema


-HTN


-HLD


-DM


-Obesity 





PLAN:


-Continue Propofol, switch Midazolam to Fentanyl and wean sedation in 

preparation for extubation with anesthesia this afternoon


-PSV 


-Continue steroids per ENT


-NPO


-DVT PPx with Lovenox


-Will need Gi PPx if remains on mechanical ventilation





Thank you for this interesting consult





Critically ill - Critical Care time 45min





Gregory Torres


Pulm/Critical Care NP

## 2019-08-24 NOTE — PN
Progress Note, Physician





- Current Medication List


Current Medications: 


Active Medications





Chlorhexidine Gluconate (Hibiclens For Decolonization -)  1 applic TP HS [facility]


   Last Admin: 08/23/19 21:39 Dose:  1 applic


Dexamethasone Sodium Phosphate (Decadron Injection -)  10 mg IVPB Q8H [facility]


   Last Admin: 08/24/19 12:39 Dose:  10 mg


Enoxaparin Sodium (Lovenox -)  30 mg SQ DAILY [facility]


   Last Admin: 08/24/19 09:31 Dose:  30 mg


Lactated Ringer's (Lactated Ringers Solution)  1,000 mls @ 75 mls/hr IV ASDIR 

[facility]


   Last Admin: 08/24/19 09:38 Dose:  75 mls/hr


Mupirocin (Bactroban Ointment (For Decolonization) -)  1 applic NS BID [facility]


   Stop: 08/28/19 21:59


   Last Admin: 08/24/19 09:31 Dose:  1 applic


Ondansetron HCl (Zofran Injection)  4 mg IVPUSH Q6H PRN


   PRN Reason: NAUSEA AND/OR VOMITING











- Objective


Vital Signs: 


 Vital Signs











Temperature  97.7 F   08/24/19 10:00


 


Pulse Rate  95 H  08/24/19 14:37


 


Respiratory Rate  17   08/24/19 14:00


 


Blood Pressure  147/87   08/24/19 14:00


 


O2 Sat by Pulse Oximetry (%)  99   08/24/19 14:37











Labs: 


 CBC, BMP





 08/24/19 05:50 





 08/24/19 05:50 











Assessment/Plan





  Surgery: 


    Patient is just extubated, about an hour ago.


 Awake and communicating.


   Neck : no swelling, wound is clean.


 Drain is removed, no drainage from drain.


  Serum calcium is 7.7 , will monitor serum calcium.


 Resume oral feeding. 


 Continue to monitor in ICU.

## 2019-08-25 LAB
ALBUMIN SERPL-MCNC: 2.8 G/DL (ref 3.4–5)
ALP SERPL-CCNC: 66 U/L (ref 45–117)
ALT SERPL-CCNC: 31 U/L (ref 13–61)
ANION GAP SERPL CALC-SCNC: 7 MMOL/L (ref 8–16)
AST SERPL-CCNC: 24 U/L (ref 15–37)
BILIRUB SERPL-MCNC: 1.2 MG/DL (ref 0.2–1)
BUN SERPL-MCNC: 14 MG/DL (ref 7–18)
CALCIUM SERPL-MCNC: 8.1 MG/DL (ref 8.5–10.1)
CHLORIDE SERPL-SCNC: 112 MMOL/L (ref 98–107)
CO2 SERPL-SCNC: 29 MMOL/L (ref 21–32)
CREAT SERPL-MCNC: 0.7 MG/DL (ref 0.55–1.3)
DEPRECATED RDW RBC AUTO: 14.3 % (ref 11.6–15.6)
GLUCOSE SERPL-MCNC: 141 MG/DL (ref 74–106)
HCT VFR BLD CALC: 34.9 % (ref 32.4–45.2)
HGB BLD-MCNC: 11.8 GM/DL (ref 10.7–15.3)
MCH RBC QN AUTO: 27.4 PG (ref 25.7–33.7)
MCHC RBC AUTO-ENTMCNC: 33.8 G/DL (ref 32–36)
MCV RBC: 81.2 FL (ref 80–96)
PLATELET # BLD AUTO: 214 K/MM3 (ref 134–434)
PMV BLD: 8.8 FL (ref 7.5–11.1)
POTASSIUM SERPLBLD-SCNC: 3.4 MMOL/L (ref 3.5–5.1)
PROT SERPL-MCNC: 6.1 G/DL (ref 6.4–8.2)
RBC # BLD AUTO: 4.31 M/MM3 (ref 3.6–5.2)
SODIUM SERPL-SCNC: 147 MMOL/L (ref 136–145)
WBC # BLD AUTO: 12.5 K/MM3 (ref 4–10)

## 2019-08-25 RX ADMIN — DEXAMETHASONE SODIUM PHOSPHATE SCH MG: 10 INJECTION, SOLUTION INTRAMUSCULAR; INTRAVENOUS at 22:00

## 2019-08-25 RX ADMIN — MUPIROCIN SCH APPLIC: 20 OINTMENT TOPICAL at 22:00

## 2019-08-25 RX ADMIN — MORPHINE SULFATE PRN MG: 2 INJECTION, SOLUTION INTRAMUSCULAR; INTRAVENOUS at 03:30

## 2019-08-25 RX ADMIN — ENOXAPARIN SODIUM SCH MG: 30 INJECTION SUBCUTANEOUS at 09:19

## 2019-08-25 RX ADMIN — MUPIROCIN SCH APPLIC: 20 OINTMENT TOPICAL at 09:21

## 2019-08-25 RX ADMIN — DEXAMETHASONE SODIUM PHOSPHATE SCH MG: 10 INJECTION, SOLUTION INTRAMUSCULAR; INTRAVENOUS at 12:04

## 2019-08-25 RX ADMIN — CALCIUM CARBONATE SCH MG: 1250 SUSPENSION ORAL at 09:19

## 2019-08-25 RX ADMIN — ACETAMINOPHEN PRN MG: 325 TABLET ORAL at 09:18

## 2019-08-25 RX ADMIN — ACETAMINOPHEN PRN MG: 325 TABLET ORAL at 18:43

## 2019-08-25 RX ADMIN — DEXAMETHASONE SODIUM PHOSPHATE SCH MG: 10 INJECTION, SOLUTION INTRAMUSCULAR; INTRAVENOUS at 05:34

## 2019-08-25 RX ADMIN — CHLORHEXIDINE GLUCONATE SCH APPLIC: 213 SOLUTION TOPICAL at 23:13

## 2019-08-25 RX ADMIN — CALCIUM CARBONATE SCH: 1250 SUSPENSION ORAL at 23:13

## 2019-08-25 NOTE — PN
Progress Note (short form)





- Note


Progress Note: 





PULMONARY/CRITICAL CARE MEDICINE PROGRESS NOTE:





SUBJECTIVE:


Pt seen and examined in the ICU.


Extubated yesterday without incident


Remained stable overnight


Pain controlled 


Off Oxygen 











OBJECTIVE:











 Current Medications





Calcium Carbonate (Calcium Carb Oral Suspension -)  500 mg PO BID [facility]


   Last Admin: 08/24/19 23:30 Dose:  500 mg


Chlorhexidine Gluconate (Hibiclens For Decolonization -)  1 applic TP HS [facility]


   Last Admin: 08/24/19 22:49 Dose:  1 applic


Dexamethasone Sodium Phosphate (Decadron Injection -)  10 mg IVPB Q8H [facility]


   Last Admin: 08/25/19 05:34 Dose:  10 mg


Enoxaparin Sodium (Lovenox -)  30 mg SQ DAILY [facility]


   Last Admin: 08/24/19 09:31 Dose:  30 mg


Lactated Ringer's (Lactated Ringers Solution)  1,000 mls @ 75 mls/hr IV ASDIR 

[facility]


   Last Admin: 08/24/19 18:58 Dose:  Not Given


Morphine Sulfate (Morphine Sulfate)  4 mg IVPUSH Q4H PRN


   PRN Reason: PAIN LEVEL 6-10


   Last Admin: 08/25/19 03:30 Dose:  4 mg


Morphine Sulfate (Morphine Sulfate)  2 mg IVPUSH Q4H PRN


   PRN Reason: PAIN LEVEL 1-5


Mupirocin (Bactroban Ointment (For Decolonization) -)  1 applic NS BID [facility]


   Stop: 08/28/19 21:59


   Last Admin: 08/24/19 22:49 Dose:  1 applic


Ondansetron HCl (Zofran Injection)  4 mg IVPUSH Q6H PRN


   PRN Reason: NAUSEA AND/OR VOMITING





 Vital Signs











Temp  98.0 F   08/24/19 20:27


 


Pulse  58 L  08/25/19 06:00


 


Resp  13   08/25/19 06:00


 


BP  137/75   08/25/19 06:00


 


Pulse Ox  83 L  08/24/19 23:23








 Intake & Output











 08/24/19 08/25/19 08/25/19





 18:59 06:59 18:59


 


Intake Total 307 90145 


 


Output Total 500 1000 


 


Balance -193 39921 


 


Weight  93.531 kg 


 


Intake:   


 


   47639 


 


    DIPRIVAN - 1,000,000 mcg 232  





    In 100 ml @ 5 MCG/KG/MIN   





    2.654 mls/hr IVPB TITR   





    [facility]IQUE Rx#:[id number]   


 


    Lactated Ringers Solution  15835 





    1,000 ml @ 75 mls/hr IV   





    ASDIR [facility] Rx#:[id number]   


 


    Sublimaze Injection - 500 42  





    Mcg In D5w - 90 ml @ 50   





    MCG/HR 10 mls/hr IVPB   





    TITR [facility] Rx#:[id number]   


 


    Versed - 100 mg In Normal 33  





    Saline - 100 ml @ 1 MG/   





    HR 1 mls/hr IVPB TITR [facility]   





    Rx#:[id number]   


 


  IVPB  750 


 


  Oral  220 


 


Output:   


 


  Urine 500 1000 


 


    Gonzalez 500 1000 


 


Other:   


 


  Voiding Method Indwelling Catheter Indwelling Catheter 


 


  Bowel Movement  No 


 


  Weight Measurement Method  Built in [facility] 














EXAM:


neuro: alert, no distress


HEENT: PERRL, dry MM, neck soft, skin incision clean and approximated, covered 

in derm-a-bond


Lungs: clear


Heart: RRR, S1 S2


Abd: obese, soft


Ext: warm, no edema








 CBC, BMP





 08/25/19 05:45 





 08/25/19 05:45 














ASSESSMENT:


-Benign thyroid/substernal goiter s/p resection


-Post-operative respiratory failure


-Airway edema


-HTN


-HLD


-DM


-Obesity 





PLAN:


-Continue steroids per ENT


-Advance diet


-DVT PPx with Lovenox


-d/c gonzalez


-Can be transferred to the floor or ?d/c home if ok with ENT








Gregory Torres


Pulm/Critical Care NP

## 2019-08-25 NOTE — PN
Progress Note, Physician


Chief Complaint: 


s/p Total Thyroidectomy


History of Present Illness: 





Previous notes and events reviewed


patient extubated 


awake and alert


NAD


tolerating oral diet


sts having mild pain at surgical wound site


K 3.4





- Current Medication List


Current Medications: 


Active Medications





Acetaminophen (Tylenol -)  650 mg PO Q4H PRN


   PRN Reason: PAIN LEVEL 1 - 3


   Last Admin: 08/25/19 09:18 Dose:  650 mg


Calcium Carbonate (Calcium Carb Oral Suspension -)  500 mg PO BID [facility]


   Last Admin: 08/25/19 09:19 Dose:  500 mg


Chlorhexidine Gluconate (Hibiclens For Decolonization -)  1 applic TP HS [facility]


   Last Admin: 08/24/19 22:49 Dose:  1 applic


Dexamethasone Sodium Phosphate (Decadron Injection -)  10 mg IVPB Q8H [facility]


   Last Admin: 08/25/19 05:34 Dose:  10 mg


Enoxaparin Sodium (Lovenox -)  30 mg SQ DAILY [facility]


   Last Admin: 08/25/19 09:19 Dose:  30 mg


Mupirocin (Bactroban Ointment (For Decolonization) -)  1 applic NS BID [facility]


   Stop: 08/28/19 21:59


   Last Admin: 08/25/19 09:21 Dose:  1 applic


Ondansetron HCl (Zofran Injection)  4 mg IVPUSH Q6H PRN


   PRN Reason: NAUSEA AND/OR VOMITING











- Objective


Vital Signs: 


 Vital Signs











Temperature  98.0 F   08/24/19 20:27


 


Pulse Rate  61   08/25/19 08:00


 


Respiratory Rate  14   08/25/19 08:00


 


Blood Pressure  125/72   08/25/19 08:00


 


O2 Sat by Pulse Oximetry (%)  95   08/25/19 07:35











Constitutional: Yes: No Distress, Calm


Eyes: Yes: Conjunctiva Clear


HENT: Yes: Atraumatic


Neck: Yes: Other (surgical incision)


Cardiovascular: Yes: Regular Rate and Rhythm


Respiratory: Yes: Regular, CTA Bilaterally


Gastrointestinal: Yes: Normal Bowel Sounds, Soft


Musculoskeletal: Yes: Muscle Weakness


Extremities: Yes: WNL


Edema: No


Neurological: Yes: Alert, Oriented


Psychiatric: Yes: Alert, Oriented


Labs: 


 CBC, BMP





 08/25/19 05:45 





 08/25/19 05:45 











Problem List





- Problems


(1) Hypertension


Assessment/Plan: 


-BP meds on hold


-monitor BP


Code(s): I10 - ESSENTIAL (PRIMARY) HYPERTENSION   





(2) Thyromegaly


Assessment/Plan: 


-POD #2 total thyroidectomy/mediastinal goiter


-patient extubated


-CARLOS drain removed


-surgery on board and recommendation appreciated


-afebrile and no leukocytosis post surgery


Code(s): E04.9 - NONTOXIC GOITER, UNSPECIFIED   





(3) Hypokalemia


Assessment/Plan: 


-K 3.4


-KCl 20mEq PO x 1 dose


-monitor electrolyte and repelete as needed


Code(s): E87.6 - HYPOKALEMIA   





Assessment/Plan





see problem list


dvt ppx

## 2019-08-26 LAB
ALBUMIN SERPL-MCNC: 2.9 G/DL (ref 3.4–5)
ALP SERPL-CCNC: 58 U/L (ref 45–117)
ALT SERPL-CCNC: 28 U/L (ref 13–61)
ANION GAP SERPL CALC-SCNC: 7 MMOL/L (ref 8–16)
AST SERPL-CCNC: 20 U/L (ref 15–37)
BILIRUB SERPL-MCNC: 1 MG/DL (ref 0.2–1)
BUN SERPL-MCNC: 18.5 MG/DL (ref 7–18)
CALCIUM SERPL-MCNC: 8.3 MG/DL (ref 8.5–10.1)
CHLORIDE SERPL-SCNC: 109 MMOL/L (ref 98–107)
CO2 SERPL-SCNC: 30 MMOL/L (ref 21–32)
CREAT SERPL-MCNC: 0.8 MG/DL (ref 0.55–1.3)
DEPRECATED RDW RBC AUTO: 14.5 % (ref 11.6–15.6)
GLUCOSE SERPL-MCNC: 139 MG/DL (ref 74–106)
HCT VFR BLD CALC: 33.3 % (ref 32.4–45.2)
HGB BLD-MCNC: 11.3 GM/DL (ref 10.7–15.3)
MAGNESIUM SERPL-MCNC: 2.1 MG/DL (ref 1.8–2.4)
MCH RBC QN AUTO: 27.7 PG (ref 25.7–33.7)
MCHC RBC AUTO-ENTMCNC: 33.8 G/DL (ref 32–36)
MCV RBC: 81.9 FL (ref 80–96)
PHOSPHATE SERPL-MCNC: 3.6 MG/DL (ref 2.5–4.9)
PLATELET # BLD AUTO: 218 K/MM3 (ref 134–434)
PMV BLD: 9 FL (ref 7.5–11.1)
POTASSIUM SERPLBLD-SCNC: 3.3 MMOL/L (ref 3.5–5.1)
PROT SERPL-MCNC: 6 G/DL (ref 6.4–8.2)
RBC # BLD AUTO: 4.06 M/MM3 (ref 3.6–5.2)
SODIUM SERPL-SCNC: 146 MMOL/L (ref 136–145)
WBC # BLD AUTO: 9.9 K/MM3 (ref 4–10)

## 2019-08-26 RX ADMIN — MUPIROCIN SCH APPLIC: 20 OINTMENT TOPICAL at 09:42

## 2019-08-26 RX ADMIN — DEXAMETHASONE SODIUM PHOSPHATE SCH MG: 10 INJECTION, SOLUTION INTRAMUSCULAR; INTRAVENOUS at 20:25

## 2019-08-26 RX ADMIN — ENOXAPARIN SODIUM SCH MG: 30 INJECTION SUBCUTANEOUS at 09:42

## 2019-08-26 RX ADMIN — ACETAMINOPHEN PRN MG: 325 TABLET ORAL at 23:21

## 2019-08-26 RX ADMIN — DEXAMETHASONE SODIUM PHOSPHATE SCH MG: 10 INJECTION, SOLUTION INTRAMUSCULAR; INTRAVENOUS at 12:45

## 2019-08-26 RX ADMIN — CALCIUM CARBONATE SCH MG: 1250 SUSPENSION ORAL at 09:43

## 2019-08-26 RX ADMIN — CALCIUM CARBONATE SCH: 1250 SUSPENSION ORAL at 09:50

## 2019-08-26 RX ADMIN — ACETAMINOPHEN PRN MG: 325 TABLET ORAL at 15:10

## 2019-08-26 RX ADMIN — DEXAMETHASONE SODIUM PHOSPHATE SCH MG: 10 INJECTION, SOLUTION INTRAMUSCULAR; INTRAVENOUS at 05:28

## 2019-08-26 RX ADMIN — ACETAMINOPHEN PRN MG: 325 TABLET ORAL at 08:34

## 2019-08-26 NOTE — PN
Teaching Attending Note


Name of Resident: Girish Castro





ATTENDING PHYSICIAN STATEMENT





I saw and evaluated the patient.


I reviewed the resident's note and discussed the case with the resident.


I agree with the resident's findings and plan as documented.








SUBJECTIVE:





Pt seen and examined in the ICU. Denies shortness of breath or dysphagia.





OBJECTIVE:





 Vital Signs











 Period  Temp  Pulse  Resp  BP Sys/Hanna  Pulse Ox


 


 Last 24 Hr  97.8 F-98.5 F  46-85  14-20  129-154/66-96  96








 Intake & Output











 08/23/19 08/24/19 08/25/19 08/26/19





 23:59 23:59 23:59 23:59


 


Intake Total 2800 89557.8 1935 100


 


Output Total 2780 2400 1200 


 


Balance 20 44245.8 735 100


 


Weight  93.123 kg 93.44 kg 








Gen:  NAD at rest


Heart: RRR


Lung: decreased breath sounds at the bases


Abd: soft, nontender


Ext: no edema





 CBC, BMP





 08/26/19 05:45 





 08/26/19 05:45 





Active Medications





Acetaminophen (Tylenol -)  650 mg PO Q4H PRN


   PRN Reason: PAIN LEVEL 1 - 3


   Last Admin: 08/26/19 08:34 Dose:  650 mg


Calcium Carbonate (Calcium Carb Oral Suspension -)  500 mg PO BID [facility]


   Last Admin: 08/26/19 09:50 Dose:  Not Given


Chlorhexidine Gluconate (Hibiclens For Decolonization -)  1 applic TP HS [facility]


   Last Admin: 08/25/19 23:13 Dose:  1 applic


Dexamethasone Sodium Phosphate (Decadron Injection -)  10 mg IVPB Q8H [facility]


   Last Admin: 08/26/19 05:28 Dose:  10 mg


Enoxaparin Sodium (Lovenox -)  30 mg SQ DAILY [facility]


   Last Admin: 08/26/19 09:42 Dose:  30 mg


Mupirocin (Bactroban Ointment (For Decolonization) -)  1 applic NS BID [facility]


   Stop: 08/28/19 21:59


   Last Admin: 08/26/19 09:42 Dose:  1 applic


Ondansetron HCl (Zofran Injection)  4 mg IVPUSH Q6H PRN


   PRN Reason: NAUSEA AND/OR VOMITING








ASSESSMENT AND PLAN:


s/p Thyroidectomy


Post op Respiratory Failure


HTN


DM


Hyperlipidemia


Morbid Obesity





-  can d/c steroids


-  PO as tolerated


-  DVT prophylaxis


-  can monitor on floor


-  d/c planning

## 2019-08-26 NOTE — PN
Progress Note, Physician


Chief Complaint: 





AWAKE ALERT


EVENTS AND NOTES REVIEWED


PATIENT S/P INTUBATION AFTER THYROIDECTOMY





- Current Medication List


Current Medications: 


Active Medications





Acetaminophen (Tylenol -)  650 mg PO Q4H PRN


   PRN Reason: PAIN LEVEL 1 - 3


   Last Admin: 08/26/19 08:34 Dose:  650 mg


Calcium Carbonate (Calcium Carb Oral Suspension -)  500 mg PO BID [facility]


   Last Admin: 08/25/19 23:13 Dose:  Not Given


Chlorhexidine Gluconate (Hibiclens For Decolonization -)  1 applic TP HS [facility]


   Last Admin: 08/25/19 23:13 Dose:  1 applic


Dexamethasone Sodium Phosphate (Decadron Injection -)  10 mg IVPB Q8H [facility]


   Last Admin: 08/26/19 05:28 Dose:  10 mg


Enoxaparin Sodium (Lovenox -)  30 mg SQ DAILY [facility]


   Last Admin: 08/25/19 09:19 Dose:  30 mg


Mupirocin (Bactroban Ointment (For Decolonization) -)  1 applic NS BID [facility]


   Stop: 08/28/19 21:59


   Last Admin: 08/25/19 22:00 Dose:  1 applic


Ondansetron HCl (Zofran Injection)  4 mg IVPUSH Q6H PRN


   PRN Reason: NAUSEA AND/OR VOMITING











- Objective


Vital Signs: 


 Vital Signs











Temperature  98.0 F   08/25/19 20:00


 


Pulse Rate  60   08/26/19 06:00


 


Respiratory Rate  20   08/26/19 06:00


 


Blood Pressure  143/79   08/26/19 06:00


 


O2 Sat by Pulse Oximetry (%)  96   08/25/19 21:00











Constitutional: Yes: Mild Distress


HENT: Yes: Other (CLEAN SCAR)


Cardiovascular: Yes: Regular Rate and Rhythm


Respiratory: Yes: WNL, On Nasal O2


Gastrointestinal: Yes: WNL


Genitourinary: Yes: WNL


Musculoskeletal: Yes: Muscle Weakness


Edema: No


Integumentary: Yes: Other


Neurological: Yes: Pre-Existing Deficit


Labs: 


 CBC, BMP





 08/26/19 05:45 





 08/26/19 05:45 











Problem List





- Problems


(1) S/P thyroidectomy


Code(s): Z98.890 - OTHER SPECIFIED POSTPROCEDURAL STATES   





(2) Airway compromise


Code(s): J98.8 - OTHER SPECIFIED RESPIRATORY DISORDERS   





(3) Exertional dyspnea


Code(s): R06.09 - OTHER FORMS OF DYSPNEA   





(4) Hypertension


Code(s): I10 - ESSENTIAL (PRIMARY) HYPERTENSION   





(5) Thyromegaly


Code(s): E04.9 - NONTOXIC GOITER, UNSPECIFIED   





Assessment/Plan





S/P EXTUBATION AFTER AIRWAY COMPRIMISED


FROM THYROIDECTOMY SURGERY.


ADA D/W PATIENT STRICT DIET AND WEIGHT LOSS


OOB TO CHAIR


DC PLANNING IN MORNING

## 2019-08-26 NOTE — PN
Physical Exam: 


SUBJECTIVE: Patient seen and examined at bedside. No acute overnight events. 

Patient was extubated over the weekend; has had no issues breathing or 

swallowing. No complaints, no pain, no chest pain or sob. Patient is ambulating 

to restroom on her own.  








OBJECTIVE:





 Vital Signs











 Period  Temp  Pulse  Resp  BP Sys/Hanna  Pulse Ox


 


 Last 24 Hr  97.8 F-98.5 F  46-85  12-20  129-154/66-96  96














GEN: Well appearing, NAD, comfortable. AAOx3


HEENT: NC/AT, EOMI. No facial asymmetry. Moist mucous membranes. Normal voice. 

Supple neck w/ FROM. Scar is dry, clean, intact, w/o exudate, bleeding, or 

discharge


CV: S1/S2, RRR, no m/r/g


LUNG: CTAB, no wheezes, crackles, rales, rhonchi. 


GI: soft, ndnt, +BS, no guarding, no rebound. Neg CVAT b/l. No masses. 


EXTREMITIES: No LE edema. No obvious deformities of all extremities. 


SKIN: warm, dry, normal turgor 


PSYCH: normal mood and affect  


NEURO: Moving all extremities well. Gait not assessed. 

















 Laboratory Results - last 24 hr











  08/25/19 08/25/19 08/26/19





  12:10 16:09 05:45


 


WBC   


 


RBC   


 


Hgb   


 


Hct   


 


MCV   


 


MCH   


 


MCHC   


 


RDW   


 


Plt Count   


 


MPV   


 


Sodium    146 H


 


Potassium    3.3 L


 


Chloride    109 H


 


Carbon Dioxide    30


 


Anion Gap    7 L


 


BUN    18.5 H


 


Creatinine    0.8


 


Est GFR (CKD-EPI)AfAm    93.53


 


Est GFR (CKD-EPI)NonAf    80.70


 


POC Glucometer  145  131 


 


Random Glucose    139 H


 


Calcium    8.3 L


 


Total Bilirubin    1.0


 


AST    20


 


ALT    28


 


Alkaline Phosphatase    58


 


Total Protein    6.0 L


 


Albumin    2.9 L














  08/26/19





  05:45


 


WBC  9.9


 


RBC  4.06


 


Hgb  11.3


 


Hct  33.3


 


MCV  81.9


 


MCH  27.7


 


MCHC  33.8


 


RDW  14.5


 


Plt Count  218


 


MPV  9.0


 


Sodium 


 


Potassium 


 


Chloride 


 


Carbon Dioxide 


 


Anion Gap 


 


BUN 


 


Creatinine 


 


Est GFR (CKD-EPI)AfAm 


 


Est GFR (CKD-EPI)NonAf 


 


POC Glucometer 


 


Random Glucose 


 


Calcium 


 


Total Bilirubin 


 


AST 


 


ALT 


 


Alkaline Phosphatase 


 


Total Protein 


 


Albumin 








Active Medications











Generic Name Dose Route Start Last Admin





  Trade Name Freq  PRN Reason Stop Dose Admin


 


Acetaminophen  650 mg  08/25/19 07:58  08/26/19 08:34





  Tylenol -  PO   650 mg





  Q4H PRN   Administration





  PAIN LEVEL 1 - 3   





     





     





     


 


Calcium Carbonate  500 mg  08/24/19 22:00  08/26/19 09:50





  Calcium Carb Oral Suspension -  PO   Not Given





  BID [facility]   





     





     





     





     


 


Chlorhexidine Gluconate  1 applic  08/23/19 22:00  08/25/19 23:13





  Hibiclens For Decolonization -  TP   1 applic





  HS QUIQUE   Administration





     





     





     





     


 


Dexamethasone Sodium Phosphate  10 mg  08/23/19 21:00  08/26/19 05:28





  Decadron Injection -  IVPB   10 mg





  Q8H QUIQUE   Administration





     





     





     





     


 


Enoxaparin Sodium  30 mg  08/23/19 16:26  08/26/19 09:42





  Lovenox -  SQ   30 mg





  DAILY QUIQUE   Administration





     





     





     





     


 


Mupirocin  1 applic  08/23/19 22:00  08/26/19 09:42





  Bactroban Ointment (For Decolonization) -  NS  08/28/19 21:59  1 applic





  BID Q[facility]QUE   Administration





     





     





     





     


 


Ondansetron HCl  4 mg  08/23/19 16:26  





  Zofran Injection  IVPUSH   





  Q6H PRN   





  NAUSEA AND/OR VOMITING   





     





     





     











ASSESSMENT/PLAN:





59F in ICU s/p goiter resection. Extubated over the weekend; has had no 

difficulty with respiratory. Airway clear. Patient is afebrile, vital signs 

stable, with benign exam. Surgical site is well healing, c/d/i. Potassium of 

3.3. For transfer to med/surg 





LUNG - s/p intubation s/p goiter resection 


- extubated 8/24/19


- breathing on RA w/ normal effort 





RENAL - hypokalemia on labs 


- Potassium low @ 3.3 


- Will replete K


- obtain mg, phos lvls 





FENGI 


- tolerating PO


- ambulating to restroom 





DVT PPX: LVX 





DISPO: transfer to med/surg 











Visit type





- Emergency Visit


Emergency Visit: Yes


ED Registration Date: 08/23/19


Care time: The patient presented to the Emergency Department on the above date 

and was hospitalized for further evaluation of their emergent condition.





- New Patient


This patient is new to me today: Yes


Date on this admission: 08/26/19





- Critical Care


Critical Care patient: Yes


Total Critical Care Time (in minutes): 30


Critical Care Statement: The care of this patient involved high complexity 

decision making to prevent further life threatening deterioration of the patient

's condition and/or to evaluate & treat vital organ system(s) failure or risk 

of failure.

## 2019-08-26 NOTE — PN
Progress Note, Physician





- Current Medication List


Current Medications: 


Active Medications





Acetaminophen (Tylenol -)  650 mg PO Q4H PRN


   PRN Reason: PAIN LEVEL 1 - 3


   Last Admin: 08/26/19 15:10 Dose:  650 mg


Calcium Carbonate (Calcium Carb Oral Suspension -)  [facility]ID [facility]


   Last Admin: 08/26/19 09:50 Dose:  Not Given


Chlorhexidine Gluconate (Hibiclens For Decolonization -)  1 applic TP HS [facility]


   Last Admin: 08/25/19 23:13 Dose:  1 applic


Dexamethasone Sodium Phosphate (Decadron Injection -)  10 mg IVPB Q8H [facility]


   Last Admin: 08/26/19 12:45 Dose:  10 mg


Enoxaparin Sodium (Lovenox -)  30 mg SQ DAILY [facility]


   Last Admin: 08/26/19 09:42 Dose:  30 mg


Mupirocin (Bactroban Ointment (For Decolonization) -)  1 applic NS BID [facility]


   Stop: 08/28/19 21:59


   Last Admin: 08/26/19 09:42 Dose:  1 applic


Ondansetron HCl (Zofran Injection)  4 mg IVPUSH Q6H PRN


   PRN Reason: NAUSEA AND/OR VOMITING











- Objective


Vital Signs: 


 Vital Signs











Temperature  98.2 F   08/26/19 12:00


 


Pulse Rate  60   08/26/19 14:00


 


Respiratory Rate  16   08/26/19 14:00


 


Blood Pressure  142/77   08/26/19 14:00


 


O2 Sat by Pulse Oximetry (%)  96   08/25/19 21:00











Labs: 


 CBC, BMP





 08/26/19 05:45 





 08/26/19 05:45 











Assessment/Plan





  Surgery: 


    Patient is alert , feeling better,


    Sje is informed of operative finfings,


    No seroma, 


   Her voice has improved.


     She can swallow with ease.


    Serum calcium 8.3 mgm


     Will start on levothyroxine Po. 


     Already on calcium carbonate PO


 Discharge tomorrow by Dr. Wendy espinoza.

## 2019-08-27 VITALS — DIASTOLIC BLOOD PRESSURE: 77 MMHG | SYSTOLIC BLOOD PRESSURE: 150 MMHG | HEART RATE: 56 BPM

## 2019-08-27 VITALS — TEMPERATURE: 98.6 F

## 2019-08-27 LAB
ANION GAP SERPL CALC-SCNC: 8 MMOL/L (ref 8–16)
BASOPHILS # BLD: 0.4 % (ref 0–2)
BUN SERPL-MCNC: 19.2 MG/DL (ref 7–18)
CALCIUM SERPL-MCNC: 8.4 MG/DL (ref 8.5–10.1)
CHLORIDE SERPL-SCNC: 104 MMOL/L (ref 98–107)
CO2 SERPL-SCNC: 30 MMOL/L (ref 21–32)
CREAT SERPL-MCNC: 0.8 MG/DL (ref 0.55–1.3)
DEPRECATED RDW RBC AUTO: 14.1 % (ref 11.6–15.6)
EOSINOPHIL # BLD: 0 % (ref 0–4.5)
GLUCOSE SERPL-MCNC: 138 MG/DL (ref 74–106)
HCT VFR BLD CALC: 34.6 % (ref 32.4–45.2)
HGB BLD-MCNC: 11.7 GM/DL (ref 10.7–15.3)
LYMPHOCYTES # BLD: 18.2 % (ref 8–40)
MAGNESIUM SERPL-MCNC: 2.3 MG/DL (ref 1.8–2.4)
MCH RBC QN AUTO: 27.6 PG (ref 25.7–33.7)
MCHC RBC AUTO-ENTMCNC: 33.7 G/DL (ref 32–36)
MCV RBC: 81.9 FL (ref 80–96)
MONOCYTES # BLD AUTO: 5.1 % (ref 3.8–10.2)
NEUTROPHILS # BLD: 76.3 % (ref 42.8–82.8)
PHOSPHATE SERPL-MCNC: 3.4 MG/DL (ref 2.5–4.9)
PLATELET # BLD AUTO: 218 K/MM3 (ref 134–434)
PMV BLD: 9 FL (ref 7.5–11.1)
POTASSIUM SERPLBLD-SCNC: 3.5 MMOL/L (ref 3.5–5.1)
RBC # BLD AUTO: 4.23 M/MM3 (ref 3.6–5.2)
SODIUM SERPL-SCNC: 142 MMOL/L (ref 136–145)
WBC # BLD AUTO: 8.6 K/MM3 (ref 4–10)

## 2019-08-27 RX ADMIN — MUPIROCIN SCH: 20 OINTMENT TOPICAL at 00:51

## 2019-08-27 RX ADMIN — CALCIUM CARBONATE SCH: 1250 SUSPENSION ORAL at 00:51

## 2019-08-27 RX ADMIN — ACETAMINOPHEN PRN MG: 325 TABLET ORAL at 11:20

## 2019-08-27 RX ADMIN — CHLORHEXIDINE GLUCONATE SCH: 213 SOLUTION TOPICAL at 00:51

## 2019-08-27 RX ADMIN — ACETAMINOPHEN PRN MG: 325 TABLET ORAL at 06:05

## 2019-08-27 NOTE — DS
Physical Examination


Vital Signs: 


 Vital Signs











Temperature  98.6 F   08/27/19 05:35


 


Pulse Rate  56 L  08/27/19 09:00


 


Respiratory Rate  18   08/27/19 09:00


 


Blood Pressure  150/77   08/27/19 09:00


 


O2 Sat by Pulse Oximetry (%)  97   08/26/19 21:00











Constitutional: Yes: No Distress


Cardiovascular: Yes: Regular Rate and Rhythm


Respiratory: Yes: WNL


Gastrointestinal: Yes: WNL


Labs: 


 CBC, BMP





 08/27/19 06:25 





 08/27/19 06:25 











Discharge Summary


Reason For Visit: THYROID GOITER


Current Active Problems





Airway compromise (Acute)


Hypokalemia (Acute)


S/P thyroidectomy (Acute)








Procedures: Principal: THYROIDECTOMY


Hospital Course: 





RESPIRATORY COMPLICATIONS AFTER INTUBATION, REMAINED INTUBATED UNTIL SWELLING 

RESOLVED. PATIENT AWAKE ALERT EXTUBATED X 2 DAYS DOING WELL. CAN LEAVE TODAY.


Condition: Good





- Instructions


Diet, Activity, Other Instructions: 


 Follow up in my office ( Dr. Kline) , call [phone] , to be seen on Thursday 9/ 5/2019 , at 4.30 pm.


SEE DR PEREZ IN 2 WEEKS [phone]


Disposition: HOME





- Home Medications


Comprehensive Discharge Medication List: 


Ambulatory Orders





Cholecalciferol (Vitamin D3) [Vitamin D3] 50,000 unit PO WEEKLY 01/07/19 


Amlodipine Besylate 5 mg PO DAILY 08/21/19 


Aspirin [ASA -] 81 mg PO DAILY 08/21/19 


Metformin HCl [Glucophage] 500 mg PO ASDIR 08/23/19 


Calcium Carbonate 500 gm MC BID #20 tab 08/25/19

## 2019-08-30 NOTE — PATH
Surgical Pathology Report



Patient Name:  KRYSTA CHRISTIANSON

Accession #:  [id number]

Med. Rec. #:  [id number]                                                        

   /Age/Gender:  1960 (Age: 59) / F

Account:  [id number]                                                          

             Location: [address] [place] [address]

Taken:  2019

Received:  2019

Reported:  2019

Physicians:  Yue Kline M.D.

  



Specimen(s) Received

 TOTAL THYRIODECTOMY 





Clinical History

Thyroid goiter







Final Diagnosis

THYROID, TOTAL THYROIDECTOMY:

THYROID TISSUE WITH HYPERPLASTIC NODULES (MULTINODULAR GOITER). 







***Electronically Signed***

Su Kennedy M.D.





Gross Description

Received in formalin, labeled "total thyroidectomy" is a thyroid weighing 70 g.

No orientation is provided. The left thyroid measures 9.0 x 2.5 x 1.3 cm. The

right thyroid and isthmus measure 7.0 x 6.0 x 2.5 cm. The external surface is

inked blue. Serial sections of the left thyroid reveal 6 nodular lesions ranging

from 0.3 cm to 1.0 cm. The right thyroid shows focal disruption and defect near

the lower pole. Serial section of the right thyroid and isthmus reveal multiple

nodular lesions ranging from 0.5-3 cm. The largest nodule show hemorrhagic

heterogenous cut surfaces. Separate fragments of tan soft tissue grossly

consistent with thyroid parenchyma measuring 3.0 x 3.0 x 1.0cm in aggregate is

present. Representative sections submitted in 19 cassettes. 1 to 5: left thyroid

with nodules; 6 to 10: Largest nodule from right thyroid; 11-12: one nodule of

right thyroid; 13-18: other nodules; 19:  soft tissue 

KWS/2019



sulki/[email or web address]

## 2021-01-25 ENCOUNTER — HOSPITAL ENCOUNTER (INPATIENT)
Dept: HOSPITAL 74 - JER | Age: 61
LOS: 11 days | DRG: 951 | End: 2021-02-05
Attending: INTERNAL MEDICINE | Admitting: HOSPITALIST
Payer: COMMERCIAL

## 2021-01-25 VITALS — BODY MASS INDEX: 33.7 KG/M2

## 2021-01-25 DIAGNOSIS — R43.8: ICD-10-CM

## 2021-01-25 DIAGNOSIS — Z86.73: ICD-10-CM

## 2021-01-25 DIAGNOSIS — J12.82: ICD-10-CM

## 2021-01-25 DIAGNOSIS — E03.9: ICD-10-CM

## 2021-01-25 DIAGNOSIS — J98.2: ICD-10-CM

## 2021-01-25 DIAGNOSIS — J80: ICD-10-CM

## 2021-01-25 DIAGNOSIS — N83.209: ICD-10-CM

## 2021-01-25 DIAGNOSIS — U07.1: Primary | ICD-10-CM

## 2021-01-25 DIAGNOSIS — E78.5: ICD-10-CM

## 2021-01-25 DIAGNOSIS — J93.9: ICD-10-CM

## 2021-01-25 DIAGNOSIS — N17.0: ICD-10-CM

## 2021-01-25 DIAGNOSIS — I10: ICD-10-CM

## 2021-01-25 DIAGNOSIS — E11.9: ICD-10-CM

## 2021-01-25 DIAGNOSIS — E87.6: ICD-10-CM

## 2021-01-25 LAB
ALBUMIN SERPL-MCNC: 3.2 G/DL (ref 3.4–5)
ALP SERPL-CCNC: 67 U/L (ref 45–117)
ALT SERPL-CCNC: 52 U/L (ref 13–61)
ANION GAP SERPL CALC-SCNC: 7 MMOL/L (ref 8–16)
APPEARANCE UR: (no result)
APTT BLD: 31.8 SECONDS (ref 25.2–36.5)
AST SERPL-CCNC: 102 U/L (ref 15–37)
BACTERIA # UR AUTO: 171 /UL (ref 0–1359)
BASE EXCESS BLDV CALC-SCNC: 5 MMOL/L (ref -2–2)
BASOPHILS # BLD: 0.3 % (ref 0–2)
BILIRUB CONJ SERPL-MCNC: 0.3 MG/DL (ref 0–0.2)
BILIRUB DIRECT SERPL-MCNC: 513 U/L (ref 84–246)
BILIRUB SERPL-MCNC: 0.7 MG/DL (ref 0.2–1)
BILIRUB UR STRIP.AUTO-MCNC: NEGATIVE MG/DL
BUN SERPL-MCNC: 12.7 MG/DL (ref 7–18)
CALCIUM SERPL-MCNC: 8.3 MG/DL (ref 8.5–10.1)
CASTS URNS QL MICRO: 11 /UL (ref 0–3.1)
CHLORIDE SERPL-SCNC: 99 MMOL/L (ref 98–107)
CO2 SERPL-SCNC: 30 MMOL/L (ref 21–32)
COLOR UR: YELLOW
CREAT SERPL-MCNC: 1.1 MG/DL (ref 0.55–1.3)
DEPRECATED RDW RBC AUTO: 14.2 % (ref 11.6–15.6)
EOSINOPHIL # BLD: 0 % (ref 0–4.5)
EPITH CASTS URNS QL MICRO: >36 /UL (ref 0–25.1)
GLUCOSE SERPL-MCNC: 288 MG/DL (ref 74–106)
HCT VFR BLD CALC: 37.9 % (ref 32.4–45.2)
HGB BLD-MCNC: 12.7 GM/DL (ref 10.7–15.3)
INR BLD: 1.08 (ref 0.83–1.09)
KETONES UR QL STRIP: NEGATIVE
LEUKOCYTE ESTERASE UR QL STRIP.AUTO: NEGATIVE
LYMPHOCYTES # BLD: 10.6 % (ref 8–40)
MCH RBC QN AUTO: 27.2 PG (ref 25.7–33.7)
MCHC RBC AUTO-ENTMCNC: 33.6 G/DL (ref 32–36)
MCV RBC: 81.1 FL (ref 80–96)
MONOCYTES # BLD AUTO: 4.3 % (ref 3.8–10.2)
NEUTROPHILS # BLD: 84.8 % (ref 42.8–82.8)
NITRITE UR QL STRIP: NEGATIVE
PCO2 BLDV: 47.2 MMHG (ref 38–52)
PH BLDV: 7.43 [PH] (ref 7.31–7.41)
PH UR: 6 [PH] (ref 5–8)
PLATELET # BLD AUTO: 139 K/MM3 (ref 134–434)
PMV BLD: 9.8 FL (ref 7.5–11.1)
POTASSIUM SERPLBLD-SCNC: 2.7 MMOL/L (ref 3.5–5.1)
PROT SERPL-MCNC: 7.6 G/DL (ref 6.4–8.2)
PROT UR QL STRIP: (no result)
PROT UR QL STRIP: (no result)
PT PNL PPP: 13 SEC (ref 9.7–13)
RBC # BLD AUTO: 359 /UL (ref 0–23.9)
RBC # BLD AUTO: 4.67 M/MM3 (ref 3.6–5.2)
SAO2 % BLDV: 66.4 % (ref 70–80)
SODIUM SERPL-SCNC: 136 MMOL/L (ref 136–145)
SP GR UR: 1.03 (ref 1.01–1.03)
UROBILINOGEN UR STRIP-MCNC: 0.2 MG/DL (ref 0.2–1)
WBC # BLD AUTO: 7.1 K/MM3 (ref 4–10)
WBC # UR AUTO: 30 /UL (ref 0–25.8)

## 2021-01-25 PROCEDURE — C9803 HOPD COVID-19 SPEC COLLECT: HCPCS

## 2021-01-25 PROCEDURE — P9017 PLASMA 1 DONOR FRZ W/IN 8 HR: HCPCS

## 2021-01-25 PROCEDURE — U0003 INFECTIOUS AGENT DETECTION BY NUCLEIC ACID (DNA OR RNA); SEVERE ACUTE RESPIRATORY SYNDROME CORONAVIRUS 2 (SARS-COV-2) (CORONAVIRUS DISEASE [COVID-19]), AMPLIFIED PROBE TECHNIQUE, MAKING USE OF HIGH THROUGHPUT TECHNOLOGIES AS DESCRIBED BY CMS-2020-01-R: HCPCS

## 2021-01-25 PROCEDURE — C9399 UNCLASSIFIED DRUGS OR BIOLOG: HCPCS

## 2021-01-25 RX ADMIN — POTASSIUM CHLORIDE SCH MLS/HR: 7.46 INJECTION, SOLUTION INTRAVENOUS at 20:43

## 2021-01-25 RX ADMIN — POTASSIUM CHLORIDE SCH MLS/HR: 7.46 INJECTION, SOLUTION INTRAVENOUS at 17:46

## 2021-01-25 RX ADMIN — INSULIN ASPART SCH UNIT: 100 INJECTION, SOLUTION INTRAVENOUS; SUBCUTANEOUS at 22:37

## 2021-01-25 RX ADMIN — INSULIN ASPART SCH UNIT: 100 INJECTION, SOLUTION INTRAVENOUS; SUBCUTANEOUS at 17:24

## 2021-01-25 RX ADMIN — Medication SCH MG: at 22:24

## 2021-01-25 RX ADMIN — OXYCODONE HYDROCHLORIDE AND ACETAMINOPHEN SCH MG: 500 TABLET ORAL at 22:24

## 2021-01-25 RX ADMIN — POTASSIUM CHLORIDE SCH MLS/HR: 7.46 INJECTION, SOLUTION INTRAVENOUS at 17:05

## 2021-01-26 LAB
ALBUMIN SERPL-MCNC: 2.8 G/DL (ref 3.4–5)
ALP SERPL-CCNC: 66 U/L (ref 45–117)
ALT SERPL-CCNC: 53 U/L (ref 13–61)
ANION GAP SERPL CALC-SCNC: 7 MMOL/L (ref 8–16)
AST SERPL-CCNC: 109 U/L (ref 15–37)
BASOPHILS # BLD: 0.1 % (ref 0–2)
BILIRUB SERPL-MCNC: 0.5 MG/DL (ref 0.2–1)
BUN SERPL-MCNC: 13.1 MG/DL (ref 7–18)
CALCIUM SERPL-MCNC: 8.4 MG/DL (ref 8.5–10.1)
CHLORIDE SERPL-SCNC: 102 MMOL/L (ref 98–107)
CO2 SERPL-SCNC: 28 MMOL/L (ref 21–32)
CREAT SERPL-MCNC: 1 MG/DL (ref 0.55–1.3)
DEPRECATED RDW RBC AUTO: 14.4 % (ref 11.6–15.6)
EOSINOPHIL # BLD: 0 % (ref 0–4.5)
GLUCOSE SERPL-MCNC: 285 MG/DL (ref 74–106)
HCT VFR BLD CALC: 36 % (ref 32.4–45.2)
HGB BLD-MCNC: 12.3 GM/DL (ref 10.7–15.3)
LYMPHOCYTES # BLD: 9.8 % (ref 8–40)
MAGNESIUM SERPL-MCNC: 2.1 MG/DL (ref 1.8–2.4)
MCH RBC QN AUTO: 27.5 PG (ref 25.7–33.7)
MCHC RBC AUTO-ENTMCNC: 34.2 G/DL (ref 32–36)
MCV RBC: 80.2 FL (ref 80–96)
MONOCYTES # BLD AUTO: 5.4 % (ref 3.8–10.2)
NEUTROPHILS # BLD: 84.7 % (ref 42.8–82.8)
PHOSPHATE SERPL-MCNC: 1.7 MG/DL (ref 2.5–4.9)
PLATELET # BLD AUTO: 162 K/MM3 (ref 134–434)
PMV BLD: 9.5 FL (ref 7.5–11.1)
POTASSIUM SERPLBLD-SCNC: 3 MMOL/L (ref 3.5–5.1)
PROT SERPL-MCNC: 7.1 G/DL (ref 6.4–8.2)
RBC # BLD AUTO: 4.48 M/MM3 (ref 3.6–5.2)
SODIUM SERPL-SCNC: 137 MMOL/L (ref 136–145)
WBC # BLD AUTO: 8.3 K/MM3 (ref 4–10)

## 2021-01-26 PROCEDURE — XW13325 TRANSFUSION OF CONVALESCENT PLASMA (NONAUTOLOGOUS) INTO PERIPHERAL VEIN, PERCUTANEOUS APPROACH, NEW TECHNOLOGY GROUP 5: ICD-10-PCS | Performed by: INTERNAL MEDICINE

## 2021-01-26 PROCEDURE — XW033E5 INTRODUCTION OF REMDESIVIR ANTI-INFECTIVE INTO PERIPHERAL VEIN, PERCUTANEOUS APPROACH, NEW TECHNOLOGY GROUP 5: ICD-10-PCS | Performed by: INTERNAL MEDICINE

## 2021-01-26 PROCEDURE — 5A1955Z RESPIRATORY VENTILATION, GREATER THAN 96 CONSECUTIVE HOURS: ICD-10-PCS | Performed by: INTERNAL MEDICINE

## 2021-01-26 RX ADMIN — ACETAMINOPHEN PRN MG: 325 TABLET ORAL at 08:46

## 2021-01-26 RX ADMIN — INSULIN ASPART SCH UNIT: 100 INJECTION, SOLUTION INTRAVENOUS; SUBCUTANEOUS at 17:21

## 2021-01-26 RX ADMIN — OXYCODONE HYDROCHLORIDE AND ACETAMINOPHEN SCH MG: 500 TABLET ORAL at 11:06

## 2021-01-26 RX ADMIN — OXYCODONE HYDROCHLORIDE AND ACETAMINOPHEN SCH MG: 500 TABLET ORAL at 21:11

## 2021-01-26 RX ADMIN — DEXAMETHASONE SCH MG: 4 TABLET ORAL at 11:06

## 2021-01-26 RX ADMIN — INSULIN ASPART SCH UNIT: 100 INJECTION, SOLUTION INTRAVENOUS; SUBCUTANEOUS at 08:27

## 2021-01-26 RX ADMIN — ASPIRIN 81 MG SCH MG: 81 TABLET ORAL at 11:07

## 2021-01-26 RX ADMIN — Medication SCH UNITS: at 11:06

## 2021-01-26 RX ADMIN — AZITHROMYCIN DIHYDRATE SCH MLS/HR: 500 INJECTION, POWDER, LYOPHILIZED, FOR SOLUTION INTRAVENOUS at 11:16

## 2021-01-26 RX ADMIN — INSULIN ASPART SCH UNIT: 100 INJECTION, SOLUTION INTRAVENOUS; SUBCUTANEOUS at 11:20

## 2021-01-26 RX ADMIN — Medication SCH MG: at 21:11

## 2021-01-26 RX ADMIN — INSULIN ASPART SCH UNIT: 100 INJECTION, SOLUTION INTRAVENOUS; SUBCUTANEOUS at 21:11

## 2021-01-26 RX ADMIN — Medication SCH MG: at 11:06

## 2021-01-26 RX ADMIN — ACETAMINOPHEN PRN MG: 325 TABLET ORAL at 23:26

## 2021-01-26 RX ADMIN — ENOXAPARIN SODIUM SCH MG: 40 INJECTION SUBCUTANEOUS at 11:06

## 2021-01-26 RX ADMIN — AMLODIPINE BESYLATE SCH MG: 5 TABLET ORAL at 11:06

## 2021-01-27 LAB
ANION GAP SERPL CALC-SCNC: 11 MMOL/L (ref 8–16)
BUN SERPL-MCNC: 16.2 MG/DL (ref 7–18)
CALCIUM SERPL-MCNC: 8.9 MG/DL (ref 8.5–10.1)
CHLORIDE SERPL-SCNC: 105 MMOL/L (ref 98–107)
CO2 SERPL-SCNC: 25 MMOL/L (ref 21–32)
CREAT SERPL-MCNC: 0.9 MG/DL (ref 0.55–1.3)
DEPRECATED RDW RBC AUTO: 14.6 % (ref 11.6–15.6)
GLUCOSE SERPL-MCNC: 284 MG/DL (ref 74–106)
HCT VFR BLD CALC: 38.4 % (ref 32.4–45.2)
HGB BLD-MCNC: 12.9 GM/DL (ref 10.7–15.3)
MCH RBC QN AUTO: 27.2 PG (ref 25.7–33.7)
MCHC RBC AUTO-ENTMCNC: 33.7 G/DL (ref 32–36)
MCV RBC: 80.6 FL (ref 80–96)
PLATELET # BLD AUTO: 225 K/MM3 (ref 134–434)
PMV BLD: 9 FL (ref 7.5–11.1)
POTASSIUM SERPLBLD-SCNC: 3 MMOL/L (ref 3.5–5.1)
RBC # BLD AUTO: 4.76 M/MM3 (ref 3.6–5.2)
SODIUM SERPL-SCNC: 142 MMOL/L (ref 136–145)
WBC # BLD AUTO: 11.3 K/MM3 (ref 4–10)

## 2021-01-27 RX ADMIN — OXYCODONE HYDROCHLORIDE AND ACETAMINOPHEN SCH MG: 500 TABLET ORAL at 10:04

## 2021-01-27 RX ADMIN — ALBUTEROL SULFATE SCH PUFF: 90 AEROSOL, METERED RESPIRATORY (INHALATION) at 13:58

## 2021-01-27 RX ADMIN — BUDESONIDE AND FORMOTEROL FUMARATE DIHYDRATE SCH PUFF: 160; 4.5 AEROSOL RESPIRATORY (INHALATION) at 15:27

## 2021-01-27 RX ADMIN — POTASSIUM CHLORIDE SCH MLS/HR: 7.46 INJECTION, SOLUTION INTRAVENOUS at 20:00

## 2021-01-27 RX ADMIN — AMLODIPINE BESYLATE SCH MG: 5 TABLET ORAL at 10:04

## 2021-01-27 RX ADMIN — ALBUTEROL SULFATE SCH PUFF: 90 AEROSOL, METERED RESPIRATORY (INHALATION) at 21:05

## 2021-01-27 RX ADMIN — INSULIN ASPART SCH UNIT: 100 INJECTION, SOLUTION INTRAVENOUS; SUBCUTANEOUS at 06:07

## 2021-01-27 RX ADMIN — INSULIN ASPART SCH UNIT: 100 INJECTION, SOLUTION INTRAVENOUS; SUBCUTANEOUS at 21:26

## 2021-01-27 RX ADMIN — INSULIN ASPART SCH UNIT: 100 INJECTION, SOLUTION INTRAVENOUS; SUBCUTANEOUS at 16:46

## 2021-01-27 RX ADMIN — Medication SCH MG: at 10:04

## 2021-01-27 RX ADMIN — POTASSIUM CHLORIDE SCH MLS/HR: 7.46 INJECTION, SOLUTION INTRAVENOUS at 18:38

## 2021-01-27 RX ADMIN — INSULIN ASPART SCH UNIT: 100 INJECTION, SOLUTION INTRAVENOUS; SUBCUTANEOUS at 12:09

## 2021-01-27 RX ADMIN — Medication SCH MG: at 21:05

## 2021-01-27 RX ADMIN — BUDESONIDE AND FORMOTEROL FUMARATE DIHYDRATE SCH PUFF: 160; 4.5 AEROSOL RESPIRATORY (INHALATION) at 21:05

## 2021-01-27 RX ADMIN — DEXAMETHASONE SCH MG: 4 TABLET ORAL at 10:04

## 2021-01-27 RX ADMIN — ACETAMINOPHEN PRN MG: 325 TABLET ORAL at 10:13

## 2021-01-27 RX ADMIN — REMDESIVIR SCH MLS/HR: 5 INJECTION INTRAVENOUS at 13:58

## 2021-01-27 RX ADMIN — ASPIRIN 81 MG SCH MG: 81 TABLET ORAL at 10:04

## 2021-01-27 RX ADMIN — ENOXAPARIN SODIUM SCH MG: 40 INJECTION SUBCUTANEOUS at 10:04

## 2021-01-27 RX ADMIN — AZITHROMYCIN DIHYDRATE SCH MLS/HR: 500 INJECTION, POWDER, LYOPHILIZED, FOR SOLUTION INTRAVENOUS at 10:04

## 2021-01-27 RX ADMIN — OXYCODONE HYDROCHLORIDE AND ACETAMINOPHEN SCH MG: 500 TABLET ORAL at 21:05

## 2021-01-27 RX ADMIN — ALBUTEROL SULFATE SCH PUFF: 90 AEROSOL, METERED RESPIRATORY (INHALATION) at 16:46

## 2021-01-27 RX ADMIN — Medication SCH UNITS: at 10:04

## 2021-01-28 LAB
ALBUMIN SERPL-MCNC: 2.6 G/DL (ref 3.4–5)
ALP SERPL-CCNC: 63 U/L (ref 45–117)
ALT SERPL-CCNC: 44 U/L (ref 13–61)
ANION GAP SERPL CALC-SCNC: 10 MMOL/L (ref 8–16)
AST SERPL-CCNC: 54 U/L (ref 15–37)
BILIRUB SERPL-MCNC: 0.7 MG/DL (ref 0.2–1)
BUN SERPL-MCNC: 15.9 MG/DL (ref 7–18)
CALCIUM SERPL-MCNC: 8.5 MG/DL (ref 8.5–10.1)
CHLORIDE SERPL-SCNC: 106 MMOL/L (ref 98–107)
CO2 SERPL-SCNC: 27 MMOL/L (ref 21–32)
CREAT SERPL-MCNC: 0.8 MG/DL (ref 0.55–1.3)
DEPRECATED RDW RBC AUTO: 14.2 % (ref 11.6–15.6)
ERYTHROCYTE [SEDIMENTATION RATE] IN BLOOD BY WESTERGREN METHOD: 102 MM/HR (ref 0–30)
GLUCOSE SERPL-MCNC: 273 MG/DL (ref 74–106)
HCT VFR BLD CALC: 36.8 % (ref 32.4–45.2)
HGB BLD-MCNC: 12.3 GM/DL (ref 10.7–15.3)
MCH RBC QN AUTO: 27.1 PG (ref 25.7–33.7)
MCHC RBC AUTO-ENTMCNC: 33.6 G/DL (ref 32–36)
MCV RBC: 80.9 FL (ref 80–96)
PLATELET # BLD AUTO: 228 K/MM3 (ref 134–434)
PMV BLD: 9 FL (ref 7.5–11.1)
POTASSIUM SERPLBLD-SCNC: 3.3 MMOL/L (ref 3.5–5.1)
PROT SERPL-MCNC: 6.9 G/DL (ref 6.4–8.2)
RBC # BLD AUTO: 4.54 M/MM3 (ref 3.6–5.2)
SODIUM SERPL-SCNC: 143 MMOL/L (ref 136–145)
WBC # BLD AUTO: 9.3 K/MM3 (ref 4–10)

## 2021-01-28 RX ADMIN — Medication SCH MG: at 09:53

## 2021-01-28 RX ADMIN — ALBUTEROL SULFATE SCH PUFF: 90 AEROSOL, METERED RESPIRATORY (INHALATION) at 09:52

## 2021-01-28 RX ADMIN — Medication SCH UNITS: at 09:53

## 2021-01-28 RX ADMIN — INSULIN ASPART SCH UNIT: 100 INJECTION, SOLUTION INTRAVENOUS; SUBCUTANEOUS at 17:19

## 2021-01-28 RX ADMIN — OXYCODONE HYDROCHLORIDE AND ACETAMINOPHEN SCH MG: 500 TABLET ORAL at 09:53

## 2021-01-28 RX ADMIN — DEXAMETHASONE SODIUM PHOSPHATE SCH MG: 4 INJECTION, SOLUTION INTRAMUSCULAR; INTRAVENOUS at 09:53

## 2021-01-28 RX ADMIN — REMDESIVIR SCH MLS/HR: 5 INJECTION INTRAVENOUS at 13:35

## 2021-01-28 RX ADMIN — ASPIRIN 81 MG SCH MG: 81 TABLET ORAL at 09:53

## 2021-01-28 RX ADMIN — ALBUTEROL SULFATE SCH PUFF: 90 AEROSOL, METERED RESPIRATORY (INHALATION) at 20:28

## 2021-01-28 RX ADMIN — BUDESONIDE AND FORMOTEROL FUMARATE DIHYDRATE SCH PUFF: 160; 4.5 AEROSOL RESPIRATORY (INHALATION) at 09:53

## 2021-01-28 RX ADMIN — ENOXAPARIN SODIUM SCH MG: 40 INJECTION SUBCUTANEOUS at 21:27

## 2021-01-28 RX ADMIN — ALBUTEROL SULFATE SCH PUFF: 90 AEROSOL, METERED RESPIRATORY (INHALATION) at 17:19

## 2021-01-28 RX ADMIN — Medication SCH MG: at 21:27

## 2021-01-28 RX ADMIN — ENOXAPARIN SODIUM SCH MG: 40 INJECTION SUBCUTANEOUS at 09:53

## 2021-01-28 RX ADMIN — ALBUTEROL SULFATE SCH PUFF: 90 AEROSOL, METERED RESPIRATORY (INHALATION) at 11:43

## 2021-01-28 RX ADMIN — AZITHROMYCIN DIHYDRATE SCH MLS/HR: 500 INJECTION, POWDER, LYOPHILIZED, FOR SOLUTION INTRAVENOUS at 14:41

## 2021-01-28 RX ADMIN — INSULIN ASPART SCH UNIT: 100 INJECTION, SOLUTION INTRAVENOUS; SUBCUTANEOUS at 11:43

## 2021-01-28 RX ADMIN — OXYCODONE HYDROCHLORIDE AND ACETAMINOPHEN SCH MG: 500 TABLET ORAL at 21:27

## 2021-01-28 RX ADMIN — INSULIN ASPART SCH UNIT: 100 INJECTION, SOLUTION INTRAVENOUS; SUBCUTANEOUS at 21:28

## 2021-01-28 RX ADMIN — POTASSIUM CHLORIDE SCH MLS/HR: 7.46 INJECTION, SOLUTION INTRAVENOUS at 16:08

## 2021-01-28 RX ADMIN — MORPHINE SULFATE PRN MG: 2 INJECTION, SOLUTION INTRAMUSCULAR; INTRAVENOUS at 17:12

## 2021-01-28 RX ADMIN — POTASSIUM CHLORIDE SCH MLS/HR: 7.46 INJECTION, SOLUTION INTRAVENOUS at 00:03

## 2021-01-28 RX ADMIN — POTASSIUM CHLORIDE SCH MLS/HR: 7.46 INJECTION, SOLUTION INTRAVENOUS at 18:15

## 2021-01-28 RX ADMIN — POTASSIUM CHLORIDE SCH MLS/HR: 7.46 INJECTION, SOLUTION INTRAVENOUS at 10:56

## 2021-01-28 RX ADMIN — ACETAMINOPHEN PRN MG: 325 TABLET ORAL at 04:44

## 2021-01-28 RX ADMIN — AMLODIPINE BESYLATE SCH MG: 5 TABLET ORAL at 09:53

## 2021-01-28 RX ADMIN — INSULIN ASPART SCH UNIT: 100 INJECTION, SOLUTION INTRAVENOUS; SUBCUTANEOUS at 06:15

## 2021-01-28 RX ADMIN — BUDESONIDE AND FORMOTEROL FUMARATE DIHYDRATE SCH PUFF: 160; 4.5 AEROSOL RESPIRATORY (INHALATION) at 21:30

## 2021-01-29 LAB
ALBUMIN SERPL-MCNC: 2.9 G/DL (ref 3.4–5)
ALP SERPL-CCNC: 76 U/L (ref 45–117)
ALT SERPL-CCNC: 45 U/L (ref 13–61)
ANION GAP SERPL CALC-SCNC: 12 MMOL/L (ref 8–16)
AST SERPL-CCNC: 66 U/L (ref 15–37)
BILIRUB SERPL-MCNC: 1.8 MG/DL (ref 0.2–1)
BUN SERPL-MCNC: 14.5 MG/DL (ref 7–18)
CALCIUM SERPL-MCNC: 8.5 MG/DL (ref 8.5–10.1)
CHLORIDE SERPL-SCNC: 100 MMOL/L (ref 98–107)
CO2 SERPL-SCNC: 27 MMOL/L (ref 21–32)
CREAT SERPL-MCNC: 0.8 MG/DL (ref 0.55–1.3)
DEPRECATED RDW RBC AUTO: 14 % (ref 11.6–15.6)
GLUCOSE SERPL-MCNC: 286 MG/DL (ref 74–106)
HCT VFR BLD CALC: 40.2 % (ref 32.4–45.2)
HGB BLD-MCNC: 13.7 GM/DL (ref 10.7–15.3)
INR BLD: 1.17 (ref 0.83–1.09)
MAGNESIUM SERPL-MCNC: 1.8 MG/DL (ref 1.8–2.4)
MCH RBC QN AUTO: 27.1 PG (ref 25.7–33.7)
MCHC RBC AUTO-ENTMCNC: 34.1 G/DL (ref 32–36)
MCV RBC: 79.5 FL (ref 80–96)
PHOSPHATE SERPL-MCNC: 2.1 MG/DL (ref 2.5–4.9)
PLATELET # BLD AUTO: 334 K/MM3 (ref 134–434)
PMV BLD: 8.6 FL (ref 7.5–11.1)
POTASSIUM SERPLBLD-SCNC: 3 MMOL/L (ref 3.5–5.1)
PROT SERPL-MCNC: 7.7 G/DL (ref 6.4–8.2)
PT PNL PPP: 14.1 SEC (ref 9.7–13)
RBC # BLD AUTO: 5.05 M/MM3 (ref 3.6–5.2)
SODIUM SERPL-SCNC: 139 MMOL/L (ref 136–145)
WBC # BLD AUTO: 17.1 K/MM3 (ref 4–10)

## 2021-01-29 RX ADMIN — AZITHROMYCIN DIHYDRATE SCH MLS/HR: 500 INJECTION, POWDER, LYOPHILIZED, FOR SOLUTION INTRAVENOUS at 11:17

## 2021-01-29 RX ADMIN — ALBUTEROL SULFATE SCH PUFF: 90 AEROSOL, METERED RESPIRATORY (INHALATION) at 17:32

## 2021-01-29 RX ADMIN — OXYCODONE HYDROCHLORIDE AND ACETAMINOPHEN SCH MG: 500 TABLET ORAL at 11:08

## 2021-01-29 RX ADMIN — BUDESONIDE AND FORMOTEROL FUMARATE DIHYDRATE SCH PUFF: 160; 4.5 AEROSOL RESPIRATORY (INHALATION) at 11:12

## 2021-01-29 RX ADMIN — ENOXAPARIN SODIUM SCH MG: 40 INJECTION SUBCUTANEOUS at 11:17

## 2021-01-29 RX ADMIN — Medication SCH: at 16:09

## 2021-01-29 RX ADMIN — ENOXAPARIN SODIUM SCH MG: 40 INJECTION SUBCUTANEOUS at 21:14

## 2021-01-29 RX ADMIN — DEXAMETHASONE SODIUM PHOSPHATE SCH MG: 4 INJECTION, SOLUTION INTRAMUSCULAR; INTRAVENOUS at 11:08

## 2021-01-29 RX ADMIN — ALBUTEROL SULFATE SCH PUFF: 90 AEROSOL, METERED RESPIRATORY (INHALATION) at 11:12

## 2021-01-29 RX ADMIN — OXYCODONE HYDROCHLORIDE AND ACETAMINOPHEN SCH MG: 500 TABLET ORAL at 21:14

## 2021-01-29 RX ADMIN — INSULIN ASPART SCH UNIT: 100 INJECTION, SOLUTION INTRAVENOUS; SUBCUTANEOUS at 12:40

## 2021-01-29 RX ADMIN — INSULIN ASPART SCH UNIT: 100 INJECTION, SOLUTION INTRAVENOUS; SUBCUTANEOUS at 17:32

## 2021-01-29 RX ADMIN — ALBUTEROL SULFATE SCH PUFF: 90 AEROSOL, METERED RESPIRATORY (INHALATION) at 20:57

## 2021-01-29 RX ADMIN — REMDESIVIR SCH MLS/HR: 5 INJECTION INTRAVENOUS at 15:43

## 2021-01-29 RX ADMIN — ALBUTEROL SULFATE SCH: 90 AEROSOL, METERED RESPIRATORY (INHALATION) at 11:12

## 2021-01-29 RX ADMIN — INSULIN ASPART SCH UNIT: 100 INJECTION, SOLUTION INTRAVENOUS; SUBCUTANEOUS at 21:14

## 2021-01-29 RX ADMIN — POTASSIUM CHLORIDE SCH MLS/HR: 7.46 INJECTION, SOLUTION INTRAVENOUS at 22:41

## 2021-01-29 RX ADMIN — POTASSIUM CHLORIDE SCH MLS/HR: 7.46 INJECTION, SOLUTION INTRAVENOUS at 20:57

## 2021-01-29 RX ADMIN — BUDESONIDE AND FORMOTEROL FUMARATE DIHYDRATE SCH PUFF: 160; 4.5 AEROSOL RESPIRATORY (INHALATION) at 21:14

## 2021-01-29 RX ADMIN — Medication SCH MG: at 21:14

## 2021-01-29 RX ADMIN — Medication SCH MG: at 11:09

## 2021-01-29 RX ADMIN — POTASSIUM CHLORIDE SCH MLS/HR: 7.46 INJECTION, SOLUTION INTRAVENOUS at 17:19

## 2021-01-29 RX ADMIN — ASPIRIN 81 MG SCH MG: 81 TABLET ORAL at 11:08

## 2021-01-29 RX ADMIN — INSULIN ASPART SCH UNIT: 100 INJECTION, SOLUTION INTRAVENOUS; SUBCUTANEOUS at 06:36

## 2021-01-29 RX ADMIN — AMLODIPINE BESYLATE SCH MG: 5 TABLET ORAL at 11:09

## 2021-01-30 LAB
ALBUMIN SERPL-MCNC: 2.5 G/DL (ref 3.4–5)
ALP SERPL-CCNC: 73 U/L (ref 45–117)
ALT SERPL-CCNC: 36 U/L (ref 13–61)
ANION GAP SERPL CALC-SCNC: 10 MMOL/L (ref 8–16)
AST SERPL-CCNC: 45 U/L (ref 15–37)
BILIRUB SERPL-MCNC: 1.3 MG/DL (ref 0.2–1)
BUN SERPL-MCNC: 16.1 MG/DL (ref 7–18)
CALCIUM SERPL-MCNC: 8.2 MG/DL (ref 8.5–10.1)
CHLORIDE SERPL-SCNC: 104 MMOL/L (ref 98–107)
CO2 SERPL-SCNC: 28 MMOL/L (ref 21–32)
CREAT SERPL-MCNC: 0.8 MG/DL (ref 0.55–1.3)
DEPRECATED RDW RBC AUTO: 14 % (ref 11.6–15.6)
GLUCOSE SERPL-MCNC: 265 MG/DL (ref 74–106)
HCT VFR BLD CALC: 37.5 % (ref 32.4–45.2)
HGB BLD-MCNC: 12.6 GM/DL (ref 10.7–15.3)
MAGNESIUM SERPL-MCNC: 2.1 MG/DL (ref 1.8–2.4)
MCH RBC QN AUTO: 26.9 PG (ref 25.7–33.7)
MCHC RBC AUTO-ENTMCNC: 33.6 G/DL (ref 32–36)
MCV RBC: 79.9 FL (ref 80–96)
PHOSPHATE SERPL-MCNC: 2.1 MG/DL (ref 2.5–4.9)
PLATELET # BLD AUTO: 322 K/MM3 (ref 134–434)
PMV BLD: 8.7 FL (ref 7.5–11.1)
POTASSIUM SERPLBLD-SCNC: 3.4 MMOL/L (ref 3.5–5.1)
PROT SERPL-MCNC: 6.8 G/DL (ref 6.4–8.2)
RBC # BLD AUTO: 4.69 M/MM3 (ref 3.6–5.2)
SODIUM SERPL-SCNC: 142 MMOL/L (ref 136–145)
WBC # BLD AUTO: 15.2 K/MM3 (ref 4–10)

## 2021-01-30 RX ADMIN — INSULIN ASPART SCH UNIT: 100 INJECTION, SOLUTION INTRAVENOUS; SUBCUTANEOUS at 11:59

## 2021-01-30 RX ADMIN — MORPHINE SULFATE PRN MG: 2 INJECTION, SOLUTION INTRAMUSCULAR; INTRAVENOUS at 04:32

## 2021-01-30 RX ADMIN — OXYCODONE HYDROCHLORIDE AND ACETAMINOPHEN SCH MG: 500 TABLET ORAL at 22:28

## 2021-01-30 RX ADMIN — ALBUTEROL SULFATE SCH PUFF: 90 AEROSOL, METERED RESPIRATORY (INHALATION) at 12:37

## 2021-01-30 RX ADMIN — ASPIRIN 81 MG SCH MG: 81 TABLET ORAL at 09:14

## 2021-01-30 RX ADMIN — BUDESONIDE AND FORMOTEROL FUMARATE DIHYDRATE SCH PUFF: 160; 4.5 AEROSOL RESPIRATORY (INHALATION) at 22:29

## 2021-01-30 RX ADMIN — ACETAMINOPHEN PRN MG: 325 TABLET ORAL at 04:32

## 2021-01-30 RX ADMIN — INSULIN ASPART SCH UNIT: 100 INJECTION, SOLUTION INTRAVENOUS; SUBCUTANEOUS at 22:28

## 2021-01-30 RX ADMIN — DEXAMETHASONE SODIUM PHOSPHATE SCH MG: 4 INJECTION, SOLUTION INTRAMUSCULAR; INTRAVENOUS at 09:13

## 2021-01-30 RX ADMIN — ALBUTEROL SULFATE SCH PUFF: 90 AEROSOL, METERED RESPIRATORY (INHALATION) at 17:14

## 2021-01-30 RX ADMIN — OXYCODONE HYDROCHLORIDE AND ACETAMINOPHEN SCH MG: 500 TABLET ORAL at 09:14

## 2021-01-30 RX ADMIN — ALBUTEROL SULFATE SCH PUFF: 90 AEROSOL, METERED RESPIRATORY (INHALATION) at 09:00

## 2021-01-30 RX ADMIN — ALBUTEROL SULFATE SCH PUFF: 90 AEROSOL, METERED RESPIRATORY (INHALATION) at 20:13

## 2021-01-30 RX ADMIN — Medication SCH MG: at 22:28

## 2021-01-30 RX ADMIN — Medication SCH UNITS: at 12:25

## 2021-01-30 RX ADMIN — INSULIN ASPART SCH UNIT: 100 INJECTION, SOLUTION INTRAVENOUS; SUBCUTANEOUS at 17:13

## 2021-01-30 RX ADMIN — POTASSIUM & SODIUM PHOSPHATES POWDER PACK 280-160-250 MG SCH PACKET: 280-160-250 PACK at 12:25

## 2021-01-30 RX ADMIN — AMLODIPINE BESYLATE SCH MG: 5 TABLET ORAL at 09:14

## 2021-01-30 RX ADMIN — Medication SCH MG: at 09:14

## 2021-01-30 RX ADMIN — ENOXAPARIN SODIUM SCH MG: 40 INJECTION SUBCUTANEOUS at 09:13

## 2021-01-30 RX ADMIN — MORPHINE SULFATE PRN MG: 2 INJECTION, SOLUTION INTRAMUSCULAR; INTRAVENOUS at 20:13

## 2021-01-30 RX ADMIN — REMDESIVIR SCH MLS/HR: 5 INJECTION INTRAVENOUS at 13:27

## 2021-01-30 RX ADMIN — INSULIN ASPART SCH UNIT: 100 INJECTION, SOLUTION INTRAVENOUS; SUBCUTANEOUS at 06:57

## 2021-01-30 RX ADMIN — ENOXAPARIN SODIUM SCH MG: 40 INJECTION SUBCUTANEOUS at 22:27

## 2021-01-30 RX ADMIN — POTASSIUM & SODIUM PHOSPHATES POWDER PACK 280-160-250 MG SCH PACKET: 280-160-250 PACK at 22:27

## 2021-01-30 RX ADMIN — BUDESONIDE AND FORMOTEROL FUMARATE DIHYDRATE SCH PUFF: 160; 4.5 AEROSOL RESPIRATORY (INHALATION) at 09:16

## 2021-01-31 LAB
ALBUMIN SERPL-MCNC: 2.5 G/DL (ref 3.4–5)
ALP SERPL-CCNC: 84 U/L (ref 45–117)
ALT SERPL-CCNC: 35 U/L (ref 13–61)
ANION GAP SERPL CALC-SCNC: 9 MMOL/L (ref 8–16)
ANISOCYTOSIS BLD QL: (no result)
ARTERIAL PATENCY WRIST A: POSITIVE
AST SERPL-CCNC: 42 U/L (ref 15–37)
BASE EXCESS BLDA CALC-SCNC: 4.3 MMOL/L (ref -2–2)
BASOPHILS # BLD: 0.3 % (ref 0–2)
BILIRUB DIRECT SERPL-MCNC: 838 U/L (ref 84–246)
BILIRUB SERPL-MCNC: 1 MG/DL (ref 0.2–1)
BREATHS.MECHANICAL ON VENT: 16
BUN SERPL-MCNC: 15.5 MG/DL (ref 7–18)
CALCIUM SERPL-MCNC: 8.4 MG/DL (ref 8.5–10.1)
CHLORIDE SERPL-SCNC: 101 MMOL/L (ref 98–107)
CO2 SERPL-SCNC: 28 MMOL/L (ref 21–32)
CREAT SERPL-MCNC: 0.7 MG/DL (ref 0.55–1.3)
DEPRECATED RDW RBC AUTO: 14.5 % (ref 11.6–15.6)
EOSINOPHIL # BLD: 0 % (ref 0–4.5)
GLUCOSE SERPL-MCNC: 253 MG/DL (ref 74–106)
HCT VFR BLD CALC: 36.9 % (ref 32.4–45.2)
HGB BLD-MCNC: 12.5 GM/DL (ref 10.7–15.3)
LYMPHOCYTES # BLD: 5.8 % (ref 8–40)
MACROCYTES BLD QL: 0
MAGNESIUM SERPL-MCNC: 2 MG/DL (ref 1.8–2.4)
MCH RBC QN AUTO: 27.1 PG (ref 25.7–33.7)
MCHC RBC AUTO-ENTMCNC: 33.8 G/DL (ref 32–36)
MCV RBC: 80.1 FL (ref 80–96)
MONOCYTES # BLD AUTO: 2.4 % (ref 3.8–10.2)
NEUTROPHILS # BLD: 91.5 % (ref 42.8–82.8)
PCO2 BLDA: 45.1 MMHG (ref 35–45)
PHOSPHATE SERPL-MCNC: 2.2 MG/DL (ref 2.5–4.9)
PLATELET # BLD AUTO: 353 K/MM3 (ref 134–434)
PLATELET BLD QL SMEAR: NORMAL
PMV BLD: 8.8 FL (ref 7.5–11.1)
PO2 BLDA: 65.2 MMHG (ref 80–100)
POTASSIUM SERPLBLD-SCNC: 3.4 MMOL/L (ref 3.5–5.1)
PROT SERPL-MCNC: 6.9 G/DL (ref 6.4–8.2)
RBC # BLD AUTO: 4.61 M/MM3 (ref 3.6–5.2)
SAO2 % BLDA: 93.2 MMHG (ref 95–98)
SODIUM SERPL-SCNC: 138 MMOL/L (ref 136–145)
VENTILATION MODE VENT: (no result)
WBC # BLD AUTO: 18 K/MM3 (ref 4–10)

## 2021-01-31 RX ADMIN — POTASSIUM & SODIUM PHOSPHATES POWDER PACK 280-160-250 MG SCH PACKET: 280-160-250 PACK at 21:54

## 2021-01-31 RX ADMIN — FUROSEMIDE SCH MG: 10 INJECTION, SOLUTION INTRAVENOUS at 16:08

## 2021-01-31 RX ADMIN — Medication SCH UNITS: at 09:58

## 2021-01-31 RX ADMIN — MORPHINE SULFATE PRN MG: 2 INJECTION, SOLUTION INTRAMUSCULAR; INTRAVENOUS at 14:04

## 2021-01-31 RX ADMIN — ENOXAPARIN SODIUM SCH MG: 40 INJECTION SUBCUTANEOUS at 09:58

## 2021-01-31 RX ADMIN — ALBUTEROL SULFATE SCH PUFF: 90 AEROSOL, METERED RESPIRATORY (INHALATION) at 16:08

## 2021-01-31 RX ADMIN — GUAIFENESIN AND CODEINE PHOSPHATE SCH ML: 10; 100 LIQUID ORAL at 18:09

## 2021-01-31 RX ADMIN — Medication SCH MG: at 09:58

## 2021-01-31 RX ADMIN — INSULIN ASPART SCH UNIT: 100 INJECTION, SOLUTION INTRAVENOUS; SUBCUTANEOUS at 11:11

## 2021-01-31 RX ADMIN — INSULIN ASPART SCH UNIT: 100 INJECTION, SOLUTION INTRAVENOUS; SUBCUTANEOUS at 21:55

## 2021-01-31 RX ADMIN — INSULIN ASPART SCH UNIT: 100 INJECTION, SOLUTION INTRAVENOUS; SUBCUTANEOUS at 16:08

## 2021-01-31 RX ADMIN — GUAIFENESIN AND CODEINE PHOSPHATE SCH ML: 10; 100 LIQUID ORAL at 14:04

## 2021-01-31 RX ADMIN — ALBUTEROL SULFATE SCH PUFF: 90 AEROSOL, METERED RESPIRATORY (INHALATION) at 21:53

## 2021-01-31 RX ADMIN — INSULIN ASPART SCH UNIT: 100 INJECTION, SOLUTION INTRAVENOUS; SUBCUTANEOUS at 06:06

## 2021-01-31 RX ADMIN — DEXAMETHASONE SODIUM PHOSPHATE SCH MG: 4 INJECTION, SOLUTION INTRAMUSCULAR; INTRAVENOUS at 09:59

## 2021-01-31 RX ADMIN — MORPHINE SULFATE PRN MG: 2 INJECTION, SOLUTION INTRAMUSCULAR; INTRAVENOUS at 21:54

## 2021-01-31 RX ADMIN — ALBUTEROL SULFATE SCH PUFF: 90 AEROSOL, METERED RESPIRATORY (INHALATION) at 13:01

## 2021-01-31 RX ADMIN — OXYCODONE HYDROCHLORIDE AND ACETAMINOPHEN SCH MG: 500 TABLET ORAL at 21:53

## 2021-01-31 RX ADMIN — BUDESONIDE AND FORMOTEROL FUMARATE DIHYDRATE SCH PUFF: 160; 4.5 AEROSOL RESPIRATORY (INHALATION) at 09:59

## 2021-01-31 RX ADMIN — ENOXAPARIN SODIUM SCH MG: 40 INJECTION SUBCUTANEOUS at 21:53

## 2021-01-31 RX ADMIN — ASPIRIN 81 MG SCH MG: 81 TABLET ORAL at 09:58

## 2021-01-31 RX ADMIN — BUDESONIDE AND FORMOTEROL FUMARATE DIHYDRATE SCH PUFF: 160; 4.5 AEROSOL RESPIRATORY (INHALATION) at 21:54

## 2021-01-31 RX ADMIN — AMLODIPINE BESYLATE SCH MG: 5 TABLET ORAL at 09:58

## 2021-01-31 RX ADMIN — OXYCODONE HYDROCHLORIDE AND ACETAMINOPHEN SCH MG: 500 TABLET ORAL at 09:59

## 2021-01-31 RX ADMIN — POTASSIUM & SODIUM PHOSPHATES POWDER PACK 280-160-250 MG SCH PACKET: 280-160-250 PACK at 09:59

## 2021-01-31 RX ADMIN — Medication SCH MG: at 21:54

## 2021-01-31 RX ADMIN — ALBUTEROL SULFATE SCH PUFF: 90 AEROSOL, METERED RESPIRATORY (INHALATION) at 09:59

## 2021-02-01 LAB
ANION GAP SERPL CALC-SCNC: 13 MMOL/L (ref 8–16)
ANISOCYTOSIS BLD QL: 0
ARTERIAL PATENCY WRIST A: POSITIVE
BASE EXCESS BLDA CALC-SCNC: -0.1 MMOL/L (ref -2–2)
BASOPHILS # BLD: 0.2 % (ref 0–2)
BILIRUB DIRECT SERPL-MCNC: 912 U/L (ref 84–246)
BREATHS.MECHANICAL ON VENT: 30
BUN SERPL-MCNC: 21.2 MG/DL (ref 7–18)
CALCIUM SERPL-MCNC: 9.1 MG/DL (ref 8.5–10.1)
CHLORIDE SERPL-SCNC: 100 MMOL/L (ref 98–107)
CO2 SERPL-SCNC: 28 MMOL/L (ref 21–32)
CREAT SERPL-MCNC: 0.8 MG/DL (ref 0.55–1.3)
DEPRECATED RDW RBC AUTO: 14.3 % (ref 11.6–15.6)
EOSINOPHIL # BLD: 0 % (ref 0–4.5)
GLUCOSE SERPL-MCNC: 309 MG/DL (ref 74–106)
HCT VFR BLD CALC: 42.4 % (ref 32.4–45.2)
HGB BLD-MCNC: 13.8 GM/DL (ref 10.7–15.3)
LYMPHOCYTES # BLD: 4 % (ref 8–40)
MACROCYTES BLD QL: 0
MAGNESIUM SERPL-MCNC: 2.3 MG/DL (ref 1.8–2.4)
MCH RBC QN AUTO: 26.8 PG (ref 25.7–33.7)
MCHC RBC AUTO-ENTMCNC: 32.6 G/DL (ref 32–36)
MCV RBC: 82.1 FL (ref 80–96)
MONOCYTES # BLD AUTO: 2.7 % (ref 3.8–10.2)
NEUTROPHILS # BLD: 93.1 % (ref 42.8–82.8)
PCO2 BLDA: 65.9 MMHG (ref 35–45)
PHOSPHATE SERPL-MCNC: 3.3 MG/DL (ref 2.5–4.9)
PLATELET # BLD AUTO: 431 K/MM3 (ref 134–434)
PLATELET BLD QL SMEAR: NORMAL
PMV BLD: 9.3 FL (ref 7.5–11.1)
PO2 BLDA: 50.5 MMHG (ref 80–100)
POTASSIUM SERPLBLD-SCNC: 3.6 MMOL/L (ref 3.5–5.1)
RBC # BLD AUTO: 5.16 M/MM3 (ref 3.6–5.2)
SAO2 % BLDA: 79.3 MMHG (ref 95–98)
SODIUM SERPL-SCNC: 141 MMOL/L (ref 136–145)
VENTILATION MODE VENT: (no result)
WBC # BLD AUTO: 21.4 K/MM3 (ref 4–10)

## 2021-02-01 PROCEDURE — B544ZZA ULTRASONOGRAPHY OF LEFT JUGULAR VEINS, GUIDANCE: ICD-10-PCS | Performed by: INTERNAL MEDICINE

## 2021-02-01 PROCEDURE — 0DH673Z INSERTION OF INFUSION DEVICE INTO STOMACH, VIA NATURAL OR ARTIFICIAL OPENING: ICD-10-PCS | Performed by: INTERNAL MEDICINE

## 2021-02-01 PROCEDURE — 05HN33Z INSERTION OF INFUSION DEVICE INTO LEFT INTERNAL JUGULAR VEIN, PERCUTANEOUS APPROACH: ICD-10-PCS | Performed by: INTERNAL MEDICINE

## 2021-02-01 PROCEDURE — 0CHY7BZ INSERTION OF AIRWAY INTO MOUTH AND THROAT, VIA NATURAL OR ARTIFICIAL OPENING: ICD-10-PCS | Performed by: INTERNAL MEDICINE

## 2021-02-01 RX ADMIN — DEXAMETHASONE SODIUM PHOSPHATE SCH MG: 4 INJECTION, SOLUTION INTRAMUSCULAR; INTRAVENOUS at 08:25

## 2021-02-01 RX ADMIN — MORPHINE SULFATE PRN MG: 2 INJECTION, SOLUTION INTRAMUSCULAR; INTRAVENOUS at 16:34

## 2021-02-01 RX ADMIN — SODIUM CHLORIDE SCH MLS/HR: 9 INJECTION, SOLUTION INTRAVENOUS at 21:37

## 2021-02-01 RX ADMIN — INSULIN ASPART SCH UNIT: 100 INJECTION, SOLUTION INTRAVENOUS; SUBCUTANEOUS at 13:00

## 2021-02-01 RX ADMIN — GUAIFENESIN AND CODEINE PHOSPHATE SCH: 10; 100 LIQUID ORAL at 19:55

## 2021-02-01 RX ADMIN — ASPIRIN 81 MG SCH: 81 TABLET ORAL at 09:42

## 2021-02-01 RX ADMIN — ALBUTEROL SULFATE SCH PUFF: 90 AEROSOL, METERED RESPIRATORY (INHALATION) at 09:31

## 2021-02-01 RX ADMIN — Medication SCH: at 09:42

## 2021-02-01 RX ADMIN — SODIUM CHLORIDE SCH MLS/HR: 9 INJECTION, SOLUTION INTRAVENOUS at 18:21

## 2021-02-01 RX ADMIN — POTASSIUM CHLORIDE SCH MEQ: 20 SOLUTION ORAL at 21:41

## 2021-02-01 RX ADMIN — GUAIFENESIN AND CODEINE PHOSPHATE SCH ML: 10; 100 LIQUID ORAL at 06:36

## 2021-02-01 RX ADMIN — Medication SCH MG: at 21:37

## 2021-02-01 RX ADMIN — ALBUTEROL SULFATE SCH: 90 AEROSOL, METERED RESPIRATORY (INHALATION) at 19:56

## 2021-02-01 RX ADMIN — OXYCODONE HYDROCHLORIDE AND ACETAMINOPHEN SCH: 500 TABLET ORAL at 09:42

## 2021-02-01 RX ADMIN — ALBUTEROL SULFATE SCH: 90 AEROSOL, METERED RESPIRATORY (INHALATION) at 13:00

## 2021-02-01 RX ADMIN — GUAIFENESIN AND CODEINE PHOSPHATE SCH ML: 10; 100 LIQUID ORAL at 13:00

## 2021-02-01 RX ADMIN — PROPOFOL SCH MLS/HR: 10 INJECTION, EMULSION INTRAVENOUS at 18:45

## 2021-02-01 RX ADMIN — ENOXAPARIN SODIUM SCH MG: 40 INJECTION SUBCUTANEOUS at 09:29

## 2021-02-01 RX ADMIN — INSULIN ASPART SCH: 100 INJECTION, SOLUTION INTRAVENOUS; SUBCUTANEOUS at 19:56

## 2021-02-01 RX ADMIN — BUDESONIDE AND FORMOTEROL FUMARATE DIHYDRATE SCH: 160; 4.5 AEROSOL RESPIRATORY (INHALATION) at 21:38

## 2021-02-01 RX ADMIN — DEXAMETHASONE SODIUM PHOSPHATE SCH: 4 INJECTION, SOLUTION INTRAMUSCULAR; INTRAVENOUS at 09:31

## 2021-02-01 RX ADMIN — FUROSEMIDE SCH MG: 10 INJECTION, SOLUTION INTRAVENOUS at 09:30

## 2021-02-01 RX ADMIN — MORPHINE SULFATE PRN MG: 2 INJECTION, SOLUTION INTRAMUSCULAR; INTRAVENOUS at 08:16

## 2021-02-01 RX ADMIN — AMLODIPINE BESYLATE SCH MG: 5 TABLET ORAL at 08:25

## 2021-02-01 RX ADMIN — GUAIFENESIN AND CODEINE PHOSPHATE SCH: 10; 100 LIQUID ORAL at 23:38

## 2021-02-01 RX ADMIN — OXYCODONE HYDROCHLORIDE AND ACETAMINOPHEN SCH MG: 500 TABLET ORAL at 21:37

## 2021-02-01 RX ADMIN — ENOXAPARIN SODIUM SCH MG: 40 INJECTION SUBCUTANEOUS at 21:05

## 2021-02-01 RX ADMIN — BUDESONIDE AND FORMOTEROL FUMARATE DIHYDRATE SCH PUFF: 160; 4.5 AEROSOL RESPIRATORY (INHALATION) at 09:31

## 2021-02-01 RX ADMIN — ALBUTEROL SULFATE SCH: 90 AEROSOL, METERED RESPIRATORY (INHALATION) at 21:38

## 2021-02-01 RX ADMIN — VECURONIUM BROMIDE SCH MLS/HR: 10 INJECTION, POWDER, LYOPHILIZED, FOR SOLUTION INTRAVENOUS at 23:37

## 2021-02-01 RX ADMIN — AMLODIPINE BESYLATE SCH: 5 TABLET ORAL at 09:43

## 2021-02-01 RX ADMIN — INSULIN ASPART SCH UNIT: 100 INJECTION, SOLUTION INTRAVENOUS; SUBCUTANEOUS at 06:36

## 2021-02-01 RX ADMIN — GUAIFENESIN AND CODEINE PHOSPHATE SCH: 10; 100 LIQUID ORAL at 00:07

## 2021-02-01 RX ADMIN — FENTANYL CITRATE SCH MLS/HR: 0.05 INJECTION, SOLUTION INTRAMUSCULAR; INTRAVENOUS at 18:20

## 2021-02-01 RX ADMIN — INSULIN ASPART SCH UNITS: 100 INJECTION, SOLUTION INTRAVENOUS; SUBCUTANEOUS at 21:38

## 2021-02-02 LAB
ALBUMIN SERPL-MCNC: 1.8 G/DL (ref 3.4–5)
ALP SERPL-CCNC: 109 U/L (ref 45–117)
ALT SERPL-CCNC: 31 U/L (ref 13–61)
ANION GAP SERPL CALC-SCNC: 6 MMOL/L (ref 8–16)
ANISOCYTOSIS BLD QL: 0
ARTERIAL PATENCY WRIST A: POSITIVE
AST SERPL-CCNC: 59 U/L (ref 15–37)
BASE EXCESS BLDA CALC-SCNC: -4.6 MMOL/L (ref -2–2)
BASOPHILS # BLD: 0.3 % (ref 0–2)
BILIRUB DIRECT SERPL-MCNC: 676 U/L (ref 84–246)
BILIRUB SERPL-MCNC: 0.6 MG/DL (ref 0.2–1)
BREATHS.MECHANICAL ON VENT: 30
BUN SERPL-MCNC: 42.4 MG/DL (ref 7–18)
CALCIUM SERPL-MCNC: 7.1 MG/DL (ref 8.5–10.1)
CHLORIDE SERPL-SCNC: 111 MMOL/L (ref 98–107)
CO2 SERPL-SCNC: 28 MMOL/L (ref 21–32)
CREAT SERPL-MCNC: 1.8 MG/DL (ref 0.55–1.3)
DEPRECATED RDW RBC AUTO: 15.4 % (ref 11.6–15.6)
EOSINOPHIL # BLD: 0 % (ref 0–4.5)
GLUCOSE SERPL-MCNC: 335 MG/DL (ref 74–106)
HCT VFR BLD CALC: 34.8 % (ref 32.4–45.2)
HGB BLD-MCNC: 11 GM/DL (ref 10.7–15.3)
LYMPHOCYTES # BLD: 4.1 % (ref 8–40)
MACROCYTES BLD QL: 0
MAGNESIUM SERPL-MCNC: 2 MG/DL (ref 1.8–2.4)
MCH RBC QN AUTO: 26.6 PG (ref 25.7–33.7)
MCHC RBC AUTO-ENTMCNC: 31.7 G/DL (ref 32–36)
MCV RBC: 84.1 FL (ref 80–96)
MONOCYTES # BLD AUTO: 3.7 % (ref 3.8–10.2)
NEUTROPHILS # BLD: 91.9 % (ref 42.8–82.8)
PCO2 BLDA: 60.2 MMHG (ref 35–45)
PHOSPHATE SERPL-MCNC: 5.4 MG/DL (ref 2.5–4.9)
PLATELET # BLD AUTO: 347 K/MM3 (ref 134–434)
PLATELET BLD QL SMEAR: NORMAL
PMV BLD: 9.4 FL (ref 7.5–11.1)
PO2 BLDA: 56.4 MMHG (ref 80–100)
POTASSIUM SERPLBLD-SCNC: 5 MMOL/L (ref 3.5–5.1)
PROT SERPL-MCNC: 5.7 G/DL (ref 6.4–8.2)
RBC # BLD AUTO: 4.14 M/MM3 (ref 3.6–5.2)
SAO2 % BLDA: 82.7 MMHG (ref 95–98)
SODIUM SERPL-SCNC: 145 MMOL/L (ref 136–145)
VENTILATION MODE VENT: (no result)
WBC # BLD AUTO: 18.6 K/MM3 (ref 4–10)

## 2021-02-02 RX ADMIN — SODIUM CHLORIDE SCH MLS/HR: 9 INJECTION, SOLUTION INTRAVENOUS at 12:53

## 2021-02-02 RX ADMIN — SODIUM CHLORIDE SCH MLS/HR: 9 INJECTION, SOLUTION INTRAVENOUS at 11:09

## 2021-02-02 RX ADMIN — GUAIFENESIN AND CODEINE PHOSPHATE SCH: 10; 100 LIQUID ORAL at 05:11

## 2021-02-02 RX ADMIN — OXYCODONE HYDROCHLORIDE AND ACETAMINOPHEN SCH MG: 500 TABLET ORAL at 09:55

## 2021-02-02 RX ADMIN — ASPIRIN 81 MG SCH MG: 81 TABLET ORAL at 09:56

## 2021-02-02 RX ADMIN — SODIUM CHLORIDE SCH MLS/HR: 9 INJECTION, SOLUTION INTRAVENOUS at 09:54

## 2021-02-02 RX ADMIN — OXYCODONE HYDROCHLORIDE AND ACETAMINOPHEN SCH MG: 500 TABLET ORAL at 21:05

## 2021-02-02 RX ADMIN — VECURONIUM BROMIDE SCH: 10 INJECTION, POWDER, LYOPHILIZED, FOR SOLUTION INTRAVENOUS at 22:36

## 2021-02-02 RX ADMIN — PROPOFOL SCH MLS/HR: 10 INJECTION, EMULSION INTRAVENOUS at 10:40

## 2021-02-02 RX ADMIN — BUDESONIDE AND FORMOTEROL FUMARATE DIHYDRATE SCH: 160; 4.5 AEROSOL RESPIRATORY (INHALATION) at 22:36

## 2021-02-02 RX ADMIN — VECURONIUM BROMIDE SCH MLS/HR: 10 INJECTION, POWDER, LYOPHILIZED, FOR SOLUTION INTRAVENOUS at 12:58

## 2021-02-02 RX ADMIN — PIPERACILLIN SODIUM,TAZOBACTAM SODIUM SCH MLS/HR: 3; .375 INJECTION, POWDER, FOR SOLUTION INTRAVENOUS at 17:38

## 2021-02-02 RX ADMIN — Medication SCH MG: at 09:55

## 2021-02-02 RX ADMIN — ALBUTEROL SULFATE SCH: 90 AEROSOL, METERED RESPIRATORY (INHALATION) at 15:40

## 2021-02-02 RX ADMIN — BUDESONIDE AND FORMOTEROL FUMARATE DIHYDRATE SCH: 160; 4.5 AEROSOL RESPIRATORY (INHALATION) at 15:40

## 2021-02-02 RX ADMIN — PANTOPRAZOLE SODIUM SCH MG: 40 INJECTION, POWDER, FOR SOLUTION INTRAVENOUS at 09:56

## 2021-02-02 RX ADMIN — INSULIN ASPART SCH UNITS: 100 INJECTION, SOLUTION INTRAVENOUS; SUBCUTANEOUS at 11:41

## 2021-02-02 RX ADMIN — Medication SCH: at 17:36

## 2021-02-02 RX ADMIN — LEVOTHYROXINE SODIUM ANHYDROUS SCH MCG: 500 INJECTION, POWDER, LYOPHILIZED, FOR SOLUTION INTRAVENOUS at 10:02

## 2021-02-02 RX ADMIN — PROPOFOL SCH MLS/HR: 10 INJECTION, EMULSION INTRAVENOUS at 20:58

## 2021-02-02 RX ADMIN — POTASSIUM CHLORIDE SCH MEQ: 20 SOLUTION ORAL at 01:45

## 2021-02-02 RX ADMIN — PIPERACILLIN SODIUM,TAZOBACTAM SODIUM SCH MLS/HR: 3; .375 INJECTION, POWDER, FOR SOLUTION INTRAVENOUS at 13:19

## 2021-02-02 RX ADMIN — INSULIN ASPART SCH UNITS: 100 INJECTION, SOLUTION INTRAVENOUS; SUBCUTANEOUS at 06:46

## 2021-02-02 RX ADMIN — INSULIN ASPART SCH UNITS: 100 INJECTION, SOLUTION INTRAVENOUS; SUBCUTANEOUS at 16:59

## 2021-02-02 RX ADMIN — Medication SCH MG: at 21:05

## 2021-02-02 RX ADMIN — FENTANYL CITRATE SCH MLS/HR: 0.05 INJECTION, SOLUTION INTRAMUSCULAR; INTRAVENOUS at 20:58

## 2021-02-02 RX ADMIN — FENTANYL CITRATE SCH MLS/HR: 0.05 INJECTION, SOLUTION INTRAMUSCULAR; INTRAVENOUS at 08:35

## 2021-02-02 RX ADMIN — Medication SCH ML: at 21:06

## 2021-02-02 RX ADMIN — ENOXAPARIN SODIUM SCH MG: 40 INJECTION SUBCUTANEOUS at 09:57

## 2021-02-02 RX ADMIN — SODIUM CHLORIDE SCH MLS/HR: 9 INJECTION, SOLUTION INTRAVENOUS at 20:58

## 2021-02-03 LAB
ALBUMIN SERPL-MCNC: 1.6 G/DL (ref 3.4–5)
ALP SERPL-CCNC: 142 U/L (ref 45–117)
ALT SERPL-CCNC: 55 U/L (ref 13–61)
ANION GAP SERPL CALC-SCNC: 7 MMOL/L (ref 8–16)
AST SERPL-CCNC: 150 U/L (ref 15–37)
BILIRUB SERPL-MCNC: 0.5 MG/DL (ref 0.2–1)
BUN SERPL-MCNC: 49.7 MG/DL (ref 7–18)
CALCIUM SERPL-MCNC: 7.3 MG/DL (ref 8.5–10.1)
CHLORIDE SERPL-SCNC: 113 MMOL/L (ref 98–107)
CO2 SERPL-SCNC: 25 MMOL/L (ref 21–32)
CREAT SERPL-MCNC: 2.1 MG/DL (ref 0.55–1.3)
GLUCOSE SERPL-MCNC: 360 MG/DL (ref 74–106)
MAGNESIUM SERPL-MCNC: 2.2 MG/DL (ref 1.8–2.4)
PHOSPHATE SERPL-MCNC: 4.9 MG/DL (ref 2.5–4.9)
POTASSIUM SERPLBLD-SCNC: 4.4 MMOL/L (ref 3.5–5.1)
PROT SERPL-MCNC: 5.9 G/DL (ref 6.4–8.2)
SODIUM SERPL-SCNC: 145 MMOL/L (ref 136–145)

## 2021-02-03 RX ADMIN — INSULIN ASPART SCH UNITS: 100 INJECTION, SOLUTION INTRAVENOUS; SUBCUTANEOUS at 16:55

## 2021-02-03 RX ADMIN — PROPOFOL SCH MLS/HR: 10 INJECTION, EMULSION INTRAVENOUS at 19:00

## 2021-02-03 RX ADMIN — FENTANYL CITRATE SCH MLS/HR: 0.05 INJECTION, SOLUTION INTRAMUSCULAR; INTRAVENOUS at 21:15

## 2021-02-03 RX ADMIN — ASPIRIN 81 MG SCH MG: 81 TABLET ORAL at 09:01

## 2021-02-03 RX ADMIN — Medication SCH MG: at 21:14

## 2021-02-03 RX ADMIN — INSULIN ASPART SCH UNITS: 100 INJECTION, SOLUTION INTRAVENOUS; SUBCUTANEOUS at 12:12

## 2021-02-03 RX ADMIN — OXYCODONE HYDROCHLORIDE AND ACETAMINOPHEN SCH MG: 500 TABLET ORAL at 21:15

## 2021-02-03 RX ADMIN — PIPERACILLIN SODIUM,TAZOBACTAM SODIUM SCH MLS/HR: 3; .375 INJECTION, POWDER, FOR SOLUTION INTRAVENOUS at 17:08

## 2021-02-03 RX ADMIN — DEXAMETHASONE SODIUM PHOSPHATE SCH MG: 4 INJECTION, SOLUTION INTRAMUSCULAR; INTRAVENOUS at 09:03

## 2021-02-03 RX ADMIN — Medication SCH UNITS: at 16:53

## 2021-02-03 RX ADMIN — SODIUM CHLORIDE SCH MLS/HR: 4.5 INJECTION, SOLUTION INTRAVENOUS at 16:57

## 2021-02-03 RX ADMIN — INSULIN ASPART SCH UNITS: 100 INJECTION, SOLUTION INTRAVENOUS; SUBCUTANEOUS at 21:35

## 2021-02-03 RX ADMIN — BUDESONIDE AND FORMOTEROL FUMARATE DIHYDRATE SCH: 160; 4.5 AEROSOL RESPIRATORY (INHALATION) at 16:43

## 2021-02-03 RX ADMIN — FENTANYL CITRATE SCH MLS/HR: 0.05 INJECTION, SOLUTION INTRAMUSCULAR; INTRAVENOUS at 09:13

## 2021-02-03 RX ADMIN — Medication SCH ML: at 09:01

## 2021-02-03 RX ADMIN — PROPOFOL SCH MLS/HR: 10 INJECTION, EMULSION INTRAVENOUS at 21:16

## 2021-02-03 RX ADMIN — PROPOFOL SCH MLS/HR: 10 INJECTION, EMULSION INTRAVENOUS at 07:44

## 2021-02-03 RX ADMIN — INSULIN ASPART SCH UNITS: 100 INJECTION, SOLUTION INTRAVENOUS; SUBCUTANEOUS at 06:47

## 2021-02-03 RX ADMIN — PANTOPRAZOLE SODIUM SCH MG: 40 INJECTION, POWDER, FOR SOLUTION INTRAVENOUS at 09:10

## 2021-02-03 RX ADMIN — SODIUM CHLORIDE SCH MLS/HR: 9 INJECTION, SOLUTION INTRAVENOUS at 18:50

## 2021-02-03 RX ADMIN — LEVOTHYROXINE SODIUM ANHYDROUS SCH MCG: 500 INJECTION, POWDER, LYOPHILIZED, FOR SOLUTION INTRAVENOUS at 16:52

## 2021-02-03 RX ADMIN — PIPERACILLIN SODIUM,TAZOBACTAM SODIUM SCH MLS/HR: 3; .375 INJECTION, POWDER, FOR SOLUTION INTRAVENOUS at 09:02

## 2021-02-03 RX ADMIN — SODIUM CHLORIDE SCH: 9 INJECTION, SOLUTION INTRAVENOUS at 12:17

## 2021-02-03 RX ADMIN — Medication SCH ML: at 17:07

## 2021-02-03 RX ADMIN — Medication SCH MG: at 09:01

## 2021-02-03 RX ADMIN — PIPERACILLIN SODIUM,TAZOBACTAM SODIUM SCH MLS/HR: 3; .375 INJECTION, POWDER, FOR SOLUTION INTRAVENOUS at 01:21

## 2021-02-03 RX ADMIN — INSULIN ASPART SCH UNITS: 100 INJECTION, SOLUTION INTRAVENOUS; SUBCUTANEOUS at 01:26

## 2021-02-03 RX ADMIN — BUDESONIDE AND FORMOTEROL FUMARATE DIHYDRATE SCH: 160; 4.5 AEROSOL RESPIRATORY (INHALATION) at 21:14

## 2021-02-03 RX ADMIN — OXYCODONE HYDROCHLORIDE AND ACETAMINOPHEN SCH MG: 500 TABLET ORAL at 09:11

## 2021-02-03 RX ADMIN — FENTANYL CITRATE SCH MLS/HR: 0.05 INJECTION, SOLUTION INTRAMUSCULAR; INTRAVENOUS at 19:00

## 2021-02-04 LAB
ALBUMIN SERPL-MCNC: 1.7 G/DL (ref 3.4–5)
ALP SERPL-CCNC: 174 U/L (ref 45–117)
ALT SERPL-CCNC: 67 U/L (ref 13–61)
ANION GAP SERPL CALC-SCNC: 11 MMOL/L (ref 8–16)
ARTERIAL PATENCY WRIST A: POSITIVE
AST SERPL-CCNC: 108 U/L (ref 15–37)
BASE EXCESS BLDA CALC-SCNC: -10.2 MMOL/L (ref -2–2)
BILIRUB SERPL-MCNC: 0.4 MG/DL (ref 0.2–1)
BREATHS.MECHANICAL ON VENT: 30
BUN SERPL-MCNC: 66.5 MG/DL (ref 7–18)
CALCIUM SERPL-MCNC: 7.6 MG/DL (ref 8.5–10.1)
CHLORIDE SERPL-SCNC: 111 MMOL/L (ref 98–107)
CO2 SERPL-SCNC: 21 MMOL/L (ref 21–32)
CREAT SERPL-MCNC: 3.1 MG/DL (ref 0.55–1.3)
DEPRECATED RDW RBC AUTO: 16.1 % (ref 11.6–15.6)
GLUCOSE SERPL-MCNC: 384 MG/DL (ref 74–106)
HCT VFR BLD CALC: 39.4 % (ref 32.4–45.2)
HGB BLD-MCNC: 12.4 GM/DL (ref 10.7–15.3)
MAGNESIUM SERPL-MCNC: 2.3 MG/DL (ref 1.8–2.4)
MCH RBC QN AUTO: 27 PG (ref 25.7–33.7)
MCHC RBC AUTO-ENTMCNC: 31.6 G/DL (ref 32–36)
MCV RBC: 85.6 FL (ref 80–96)
PCO2 BLDA: 72.4 MMHG (ref 35–45)
PHOSPHATE SERPL-MCNC: 6.2 MG/DL (ref 2.5–4.9)
PLATELET # BLD AUTO: 294 K/MM3 (ref 134–434)
PMV BLD: 9.6 FL (ref 7.5–11.1)
PO2 BLDA: 50 MMHG (ref 80–100)
POTASSIUM SERPLBLD-SCNC: 4.9 MMOL/L (ref 3.5–5.1)
PROT SERPL-MCNC: 6 G/DL (ref 6.4–8.2)
RBC # BLD AUTO: 4.6 M/MM3 (ref 3.6–5.2)
SAO2 % BLDA: 69.6 MMHG (ref 95–98)
SODIUM SERPL-SCNC: 143 MMOL/L (ref 136–145)
VENTILATION MODE VENT: (no result)
WBC # BLD AUTO: 14.5 K/MM3 (ref 4–10)

## 2021-02-04 RX ADMIN — FENTANYL CITRATE SCH: 0.05 INJECTION, SOLUTION INTRAMUSCULAR; INTRAVENOUS at 17:50

## 2021-02-04 RX ADMIN — SODIUM CHLORIDE SCH: 4.5 INJECTION, SOLUTION INTRAVENOUS at 12:02

## 2021-02-04 RX ADMIN — ASPIRIN 81 MG SCH MG: 81 TABLET ORAL at 09:36

## 2021-02-04 RX ADMIN — FENTANYL CITRATE SCH MLS/HR: 0.05 INJECTION, SOLUTION INTRAMUSCULAR; INTRAVENOUS at 10:40

## 2021-02-04 RX ADMIN — Medication SCH UNITS: at 12:14

## 2021-02-04 RX ADMIN — Medication SCH ML: at 16:52

## 2021-02-04 RX ADMIN — SODIUM CHLORIDE SCH: 9 INJECTION, SOLUTION INTRAVENOUS at 17:50

## 2021-02-04 RX ADMIN — INSULIN ASPART SCH UNITS: 100 INJECTION, SOLUTION INTRAVENOUS; SUBCUTANEOUS at 07:02

## 2021-02-04 RX ADMIN — Medication SCH ML: at 07:55

## 2021-02-04 RX ADMIN — DEXAMETHASONE SODIUM PHOSPHATE SCH MG: 4 INJECTION, SOLUTION INTRAMUSCULAR; INTRAVENOUS at 09:32

## 2021-02-04 RX ADMIN — Medication SCH MG: at 09:36

## 2021-02-04 RX ADMIN — Medication SCH: at 10:21

## 2021-02-04 RX ADMIN — SODIUM BICARBONATE SCH MEQ: 84 INJECTION, SOLUTION INTRAVENOUS at 22:01

## 2021-02-04 RX ADMIN — LEVOTHYROXINE SODIUM ANHYDROUS SCH MCG: 500 INJECTION, POWDER, LYOPHILIZED, FOR SOLUTION INTRAVENOUS at 10:21

## 2021-02-04 RX ADMIN — INSULIN ASPART SCH UNITS: 100 INJECTION, SOLUTION INTRAVENOUS; SUBCUTANEOUS at 10:42

## 2021-02-04 RX ADMIN — INSULIN ASPART SCH UNITS: 100 INJECTION, SOLUTION INTRAVENOUS; SUBCUTANEOUS at 22:01

## 2021-02-04 RX ADMIN — PROPOFOL SCH MLS/HR: 10 INJECTION, EMULSION INTRAVENOUS at 16:21

## 2021-02-04 RX ADMIN — SODIUM CHLORIDE SCH MLS/HR: 9 INJECTION, SOLUTION INTRAVENOUS at 12:03

## 2021-02-04 RX ADMIN — INSULIN ASPART SCH UNITS: 100 INJECTION, SOLUTION INTRAVENOUS; SUBCUTANEOUS at 16:52

## 2021-02-04 RX ADMIN — PIPERACILLIN SODIUM AND TAZOBACTAM SODIUM SCH MLS/HR: .25; 2 INJECTION, POWDER, LYOPHILIZED, FOR SOLUTION INTRAVENOUS at 17:06

## 2021-02-04 RX ADMIN — PANTOPRAZOLE SODIUM SCH MG: 40 INJECTION, POWDER, FOR SOLUTION INTRAVENOUS at 09:32

## 2021-02-04 RX ADMIN — SODIUM CHLORIDE SCH MLS/HR: 4.5 INJECTION, SOLUTION INTRAVENOUS at 07:55

## 2021-02-04 RX ADMIN — PIPERACILLIN SODIUM,TAZOBACTAM SODIUM SCH MLS/HR: 3; .375 INJECTION, POWDER, FOR SOLUTION INTRAVENOUS at 09:36

## 2021-02-04 RX ADMIN — BUDESONIDE AND FORMOTEROL FUMARATE DIHYDRATE SCH: 160; 4.5 AEROSOL RESPIRATORY (INHALATION) at 21:47

## 2021-02-04 RX ADMIN — SODIUM BICARBONATE SCH MEQ: 84 INJECTION, SOLUTION INTRAVENOUS at 17:06

## 2021-02-04 RX ADMIN — OXYCODONE HYDROCHLORIDE AND ACETAMINOPHEN SCH MG: 500 TABLET ORAL at 09:36

## 2021-02-04 RX ADMIN — Medication SCH MG: at 22:01

## 2021-02-04 RX ADMIN — OXYCODONE HYDROCHLORIDE AND ACETAMINOPHEN SCH MG: 500 TABLET ORAL at 22:02

## 2021-02-04 RX ADMIN — FENTANYL CITRATE SCH MLS/HR: 0.05 INJECTION, SOLUTION INTRAMUSCULAR; INTRAVENOUS at 16:20

## 2021-02-04 RX ADMIN — VECURONIUM BROMIDE SCH: 10 INJECTION, POWDER, LYOPHILIZED, FOR SOLUTION INTRAVENOUS at 01:17

## 2021-02-04 RX ADMIN — PIPERACILLIN SODIUM,TAZOBACTAM SODIUM SCH MLS/HR: 3; .375 INJECTION, POWDER, FOR SOLUTION INTRAVENOUS at 01:29

## 2021-02-04 RX ADMIN — SODIUM BICARBONATE SCH MEQ: 84 INJECTION, SOLUTION INTRAVENOUS at 14:46

## 2021-02-04 RX ADMIN — PROPOFOL SCH MLS/HR: 10 INJECTION, EMULSION INTRAVENOUS at 22:01

## 2021-02-04 RX ADMIN — BUDESONIDE AND FORMOTEROL FUMARATE DIHYDRATE SCH: 160; 4.5 AEROSOL RESPIRATORY (INHALATION) at 09:36

## 2021-02-05 VITALS — HEART RATE: 81 BPM | DIASTOLIC BLOOD PRESSURE: 33 MMHG | SYSTOLIC BLOOD PRESSURE: 55 MMHG

## 2021-02-05 VITALS — TEMPERATURE: 99.6 F

## 2021-02-05 LAB
ALBUMIN SERPL-MCNC: 1.4 G/DL (ref 3.4–5)
ALP SERPL-CCNC: 141 U/L (ref 45–117)
ALT SERPL-CCNC: 46 U/L (ref 13–61)
ANION GAP SERPL CALC-SCNC: 10 MMOL/L (ref 8–16)
ANISOCYTOSIS BLD QL: 0
ARTERIAL PATENCY WRIST A: POSITIVE
AST SERPL-CCNC: 68 U/L (ref 15–37)
BASE EXCESS BLDA CALC-SCNC: -5.7 MMOL/L (ref -2–2)
BASOPHILS # BLD: 0.3 % (ref 0–2)
BILIRUB SERPL-MCNC: 0.4 MG/DL (ref 0.2–1)
BREATHS.MECHANICAL ON VENT: 30
BUN SERPL-MCNC: 88.7 MG/DL (ref 7–18)
CALCIUM SERPL-MCNC: 7.3 MG/DL (ref 8.5–10.1)
CHLORIDE SERPL-SCNC: 110 MMOL/L (ref 98–107)
CO2 SERPL-SCNC: 25 MMOL/L (ref 21–32)
CREAT SERPL-MCNC: 4.7 MG/DL (ref 0.55–1.3)
DEPRECATED RDW RBC AUTO: 15.8 % (ref 11.6–15.6)
EOSINOPHIL # BLD: 0.1 % (ref 0–4.5)
GLUCOSE SERPL-MCNC: 238 MG/DL (ref 74–106)
HCT VFR BLD CALC: 33.7 % (ref 32.4–45.2)
HGB BLD-MCNC: 10.9 GM/DL (ref 10.7–15.3)
LYMPHOCYTES # BLD: 5.5 % (ref 8–40)
MACROCYTES BLD QL: 0
MAGNESIUM SERPL-MCNC: 2.3 MG/DL (ref 1.8–2.4)
MCH RBC QN AUTO: 27.1 PG (ref 25.7–33.7)
MCHC RBC AUTO-ENTMCNC: 32.2 G/DL (ref 32–36)
MCV RBC: 84 FL (ref 80–96)
MONOCYTES # BLD AUTO: 1.4 % (ref 3.8–10.2)
NEUTROPHILS # BLD: 92.7 % (ref 42.8–82.8)
PCO2 BLDA: 80.3 MMHG (ref 35–45)
PHOSPHATE SERPL-MCNC: 6.7 MG/DL (ref 2.5–4.9)
PLATELET # BLD AUTO: 196 K/MM3 (ref 134–434)
PLATELET BLD QL SMEAR: NORMAL
PMV BLD: 10 FL (ref 7.5–11.1)
PO2 BLDA: 31.6 MMHG (ref 80–100)
POTASSIUM SERPLBLD-SCNC: 4.5 MMOL/L (ref 3.5–5.1)
PROT SERPL-MCNC: 5.1 G/DL (ref 6.4–8.2)
RBC # BLD AUTO: 4.02 M/MM3 (ref 3.6–5.2)
SAO2 % BLDA: 41.7 MMHG (ref 95–98)
SODIUM SERPL-SCNC: 146 MMOL/L (ref 136–145)
VENTILATION MODE VENT: AC
WBC # BLD AUTO: 10.8 K/MM3 (ref 4–10)

## 2021-02-05 RX ADMIN — DEXAMETHASONE SODIUM PHOSPHATE SCH MG: 4 INJECTION, SOLUTION INTRAMUSCULAR; INTRAVENOUS at 10:07

## 2021-02-05 RX ADMIN — SODIUM CHLORIDE SCH MLS/HR: 9 INJECTION, SOLUTION INTRAVENOUS at 12:00

## 2021-02-05 RX ADMIN — INSULIN ASPART SCH UNITS: 100 INJECTION, SOLUTION INTRAVENOUS; SUBCUTANEOUS at 05:59

## 2021-02-05 RX ADMIN — Medication SCH UNITS: at 10:09

## 2021-02-05 RX ADMIN — SODIUM BICARBONATE SCH MEQ: 84 INJECTION, SOLUTION INTRAVENOUS at 06:05

## 2021-02-05 RX ADMIN — PANTOPRAZOLE SODIUM SCH MG: 40 INJECTION, POWDER, FOR SOLUTION INTRAVENOUS at 10:07

## 2021-02-05 RX ADMIN — SODIUM BICARBONATE SCH MEQ: 84 INJECTION, SOLUTION INTRAVENOUS at 18:53

## 2021-02-05 RX ADMIN — BUDESONIDE AND FORMOTEROL FUMARATE DIHYDRATE SCH: 160; 4.5 AEROSOL RESPIRATORY (INHALATION) at 10:09

## 2021-02-05 RX ADMIN — SODIUM BICARBONATE SCH MEQ: 84 INJECTION, SOLUTION INTRAVENOUS at 01:47

## 2021-02-05 RX ADMIN — PIPERACILLIN SODIUM AND TAZOBACTAM SODIUM SCH MLS/HR: .25; 2 INJECTION, POWDER, LYOPHILIZED, FOR SOLUTION INTRAVENOUS at 10:09

## 2021-02-05 RX ADMIN — SODIUM BICARBONATE SCH MEQ: 84 INJECTION, SOLUTION INTRAVENOUS at 15:08

## 2021-02-05 RX ADMIN — OXYCODONE HYDROCHLORIDE AND ACETAMINOPHEN SCH MG: 500 TABLET ORAL at 10:09

## 2021-02-05 RX ADMIN — Medication SCH MG: at 10:07

## 2021-02-05 RX ADMIN — Medication SCH ML: at 09:07

## 2021-02-05 RX ADMIN — INSULIN ASPART SCH UNITS: 100 INJECTION, SOLUTION INTRAVENOUS; SUBCUTANEOUS at 18:52

## 2021-02-05 RX ADMIN — INSULIN ASPART SCH UNITS: 100 INJECTION, SOLUTION INTRAVENOUS; SUBCUTANEOUS at 11:52

## 2021-02-05 RX ADMIN — SODIUM BICARBONATE SCH MEQ: 84 INJECTION, SOLUTION INTRAVENOUS at 10:08

## 2021-02-05 RX ADMIN — ASPIRIN 81 MG SCH MG: 81 TABLET ORAL at 10:07

## 2021-02-05 RX ADMIN — PIPERACILLIN SODIUM AND TAZOBACTAM SODIUM SCH MLS/HR: .25; 2 INJECTION, POWDER, LYOPHILIZED, FOR SOLUTION INTRAVENOUS at 18:53

## 2021-02-05 RX ADMIN — Medication SCH ML: at 18:53

## 2021-02-05 RX ADMIN — PIPERACILLIN SODIUM AND TAZOBACTAM SODIUM SCH MLS/HR: .25; 2 INJECTION, POWDER, LYOPHILIZED, FOR SOLUTION INTRAVENOUS at 02:03

## 2021-02-05 RX ADMIN — VECURONIUM BROMIDE SCH: 10 INJECTION, POWDER, LYOPHILIZED, FOR SOLUTION INTRAVENOUS at 01:47

## 2021-02-05 RX ADMIN — LEVOTHYROXINE SODIUM ANHYDROUS SCH MCG: 500 INJECTION, POWDER, LYOPHILIZED, FOR SOLUTION INTRAVENOUS at 10:09
